# Patient Record
Sex: FEMALE | Race: WHITE | Employment: OTHER | URBAN - METROPOLITAN AREA
[De-identification: names, ages, dates, MRNs, and addresses within clinical notes are randomized per-mention and may not be internally consistent; named-entity substitution may affect disease eponyms.]

---

## 2017-01-01 ENCOUNTER — APPOINTMENT (OUTPATIENT)
Dept: GENERAL RADIOLOGY | Age: 82
End: 2017-01-01
Attending: EMERGENCY MEDICINE
Payer: MEDICARE

## 2017-01-01 ENCOUNTER — HOME CARE VISIT (OUTPATIENT)
Dept: SCHEDULING | Facility: HOME HEALTH | Age: 82
End: 2017-01-01

## 2017-01-01 ENCOUNTER — HOME CARE VISIT (OUTPATIENT)
Dept: HOME HEALTH SERVICES | Facility: HOME HEALTH | Age: 82
End: 2017-01-01

## 2017-01-01 ENCOUNTER — APPOINTMENT (OUTPATIENT)
Dept: CT IMAGING | Age: 82
End: 2017-01-01
Attending: EMERGENCY MEDICINE
Payer: MEDICARE

## 2017-01-01 ENCOUNTER — HOSPITAL ENCOUNTER (OUTPATIENT)
Age: 82
Setting detail: OBSERVATION
Discharge: HOME HEALTH | End: 2017-10-04
Attending: EMERGENCY MEDICINE | Admitting: FAMILY MEDICINE
Payer: MEDICARE

## 2017-01-01 ENCOUNTER — HOME HEALTH ADMISSION (OUTPATIENT)
Dept: HOME HEALTH SERVICES | Facility: HOME HEALTH | Age: 82
End: 2017-01-01

## 2017-01-01 ENCOUNTER — HOSPITAL ENCOUNTER (OUTPATIENT)
Age: 82
Setting detail: OBSERVATION
Discharge: HOME | End: 2017-01-22
Attending: EMERGENCY MEDICINE | Admitting: INTERNAL MEDICINE
Payer: MEDICARE

## 2017-01-01 ENCOUNTER — PATIENT OUTREACH (OUTPATIENT)
Dept: CASE MANAGEMENT | Age: 82
End: 2017-01-01

## 2017-01-01 ENCOUNTER — APPOINTMENT (OUTPATIENT)
Dept: MRI IMAGING | Age: 82
End: 2017-01-01
Attending: INTERNAL MEDICINE
Payer: MEDICARE

## 2017-01-01 ENCOUNTER — HOSPITAL ENCOUNTER (EMERGENCY)
Age: 82
Discharge: HOME | End: 2017-08-25
Attending: EMERGENCY MEDICINE
Payer: MEDICARE

## 2017-01-01 ENCOUNTER — HOSPITAL ENCOUNTER (EMERGENCY)
Age: 82
Discharge: HOME | End: 2017-07-21
Attending: EMERGENCY MEDICINE
Payer: MEDICARE

## 2017-01-01 VITALS
TEMPERATURE: 97.9 F | OXYGEN SATURATION: 93 % | BODY MASS INDEX: 25.52 KG/M2 | HEART RATE: 54 BPM | HEIGHT: 60 IN | DIASTOLIC BLOOD PRESSURE: 105 MMHG | RESPIRATION RATE: 18 BRPM | SYSTOLIC BLOOD PRESSURE: 231 MMHG | WEIGHT: 130 LBS

## 2017-01-01 VITALS
OXYGEN SATURATION: 94 % | TEMPERATURE: 98 F | DIASTOLIC BLOOD PRESSURE: 106 MMHG | WEIGHT: 146 LBS | RESPIRATION RATE: 18 BRPM | SYSTOLIC BLOOD PRESSURE: 227 MMHG | HEIGHT: 60 IN | HEART RATE: 60 BPM | BODY MASS INDEX: 28.66 KG/M2

## 2017-01-01 VITALS
OXYGEN SATURATION: 94 % | TEMPERATURE: 97.3 F | RESPIRATION RATE: 18 BRPM | BODY MASS INDEX: 28.33 KG/M2 | WEIGHT: 144.3 LBS | SYSTOLIC BLOOD PRESSURE: 144 MMHG | DIASTOLIC BLOOD PRESSURE: 75 MMHG | HEART RATE: 66 BPM | HEIGHT: 60 IN

## 2017-01-01 VITALS
SYSTOLIC BLOOD PRESSURE: 144 MMHG | HEIGHT: 60 IN | TEMPERATURE: 98 F | OXYGEN SATURATION: 92 % | DIASTOLIC BLOOD PRESSURE: 84 MMHG | WEIGHT: 123 LBS | BODY MASS INDEX: 24.15 KG/M2 | RESPIRATION RATE: 16 BRPM | HEART RATE: 65 BPM

## 2017-01-01 DIAGNOSIS — S00.03XA CONTUSION OF SCALP, INITIAL ENCOUNTER: Primary | ICD-10-CM
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
## 2017-01-01 DIAGNOSIS — I20.0 UNSTABLE ANGINA (HCC): Primary | ICD-10-CM

## 2017-01-01 DIAGNOSIS — I15.9 SECONDARY HYPERTENSION: Primary | ICD-10-CM

## 2017-01-01 DIAGNOSIS — R41.0 DELIRIUM: Primary | ICD-10-CM

## 2017-01-01 DIAGNOSIS — I10 HYPERTENSION, UNCONTROLLED: ICD-10-CM

## 2017-01-01 DIAGNOSIS — S20.222A CONTUSION OF LEFT BACK WALL OF THORAX, INITIAL ENCOUNTER: ICD-10-CM

## 2017-01-01 DIAGNOSIS — R42 VERTIGO: ICD-10-CM

## 2017-01-01 LAB
ALBUMIN SERPL BCP-MCNC: 3.4 G/DL (ref 3.2–4.6)
ALBUMIN SERPL BCP-MCNC: 3.7 G/DL (ref 3.2–4.6)
ALBUMIN SERPL-MCNC: 3.7 G/DL (ref 3.2–4.6)
ALBUMIN/GLOB SERPL: 0.9 {RATIO} (ref 1.2–3.5)
ALBUMIN/GLOB SERPL: 1 {RATIO} (ref 1.2–3.5)
ALBUMIN/GLOB SERPL: 1 {RATIO} (ref 1.2–3.5)
ALP SERPL-CCNC: 73 U/L (ref 50–136)
ALP SERPL-CCNC: 90 U/L (ref 50–136)
ALP SERPL-CCNC: 90 U/L (ref 50–136)
ALT SERPL-CCNC: 13 U/L (ref 12–65)
ALT SERPL-CCNC: 14 U/L (ref 12–65)
ALT SERPL-CCNC: 15 U/L (ref 12–65)
ANION GAP BLD CALC-SCNC: 11 MMOL/L (ref 7–16)
ANION GAP BLD CALC-SCNC: 7 MMOL/L (ref 7–16)
ANION GAP SERPL CALC-SCNC: 9 MMOL/L (ref 7–16)
AST SERPL W P-5'-P-CCNC: 36 U/L (ref 15–37)
AST SERPL W P-5'-P-CCNC: 38 U/L (ref 15–37)
AST SERPL-CCNC: 35 U/L (ref 15–37)
ATRIAL RATE: 66 BPM
ATRIAL RATE: 67 BPM
ATRIAL RATE: 68 BPM
ATRIAL RATE: 68 BPM
ATRIAL RATE: 71 BPM
ATRIAL RATE: 81 BPM
BACTERIA SPEC CULT: NORMAL
BACTERIA URNS QL MICRO: 0 /HPF
BACTERIA URNS QL MICRO: ABNORMAL /HPF
BASOPHILS # BLD AUTO: 0.1 K/UL (ref 0–0.2)
BASOPHILS # BLD: 0 K/UL (ref 0–0.2)
BASOPHILS # BLD: 1 % (ref 0–2)
BASOPHILS NFR BLD: 0 % (ref 0–2)
BILIRUB SERPL-MCNC: 0.3 MG/DL (ref 0.2–1.1)
BILIRUB SERPL-MCNC: 0.3 MG/DL (ref 0.2–1.1)
BILIRUB SERPL-MCNC: 0.4 MG/DL (ref 0.2–1.1)
BNP SERPL-MCNC: 159 PG/ML
BUN SERPL-MCNC: 17 MG/DL (ref 8–23)
BUN SERPL-MCNC: 18 MG/DL (ref 8–23)
BUN SERPL-MCNC: 21 MG/DL (ref 8–23)
CALCIUM SERPL-MCNC: 8.4 MG/DL (ref 8.3–10.4)
CALCIUM SERPL-MCNC: 9 MG/DL (ref 8.3–10.4)
CALCIUM SERPL-MCNC: 9.7 MG/DL (ref 8.3–10.4)
CALCULATED P AXIS, ECG09: 24 DEGREES
CALCULATED P AXIS, ECG09: 35 DEGREES
CALCULATED P AXIS, ECG09: 43 DEGREES
CALCULATED P AXIS, ECG09: 50 DEGREES
CALCULATED P AXIS, ECG09: 51 DEGREES
CALCULATED P AXIS, ECG09: 55 DEGREES
CALCULATED R AXIS, ECG10: -17 DEGREES
CALCULATED R AXIS, ECG10: -32 DEGREES
CALCULATED R AXIS, ECG10: -37 DEGREES
CALCULATED R AXIS, ECG10: -42 DEGREES
CALCULATED R AXIS, ECG10: -43 DEGREES
CALCULATED R AXIS, ECG10: -44 DEGREES
CALCULATED T AXIS, ECG11: -14 DEGREES
CALCULATED T AXIS, ECG11: 120 DEGREES
CALCULATED T AXIS, ECG11: 129 DEGREES
CALCULATED T AXIS, ECG11: 137 DEGREES
CALCULATED T AXIS, ECG11: 143 DEGREES
CALCULATED T AXIS, ECG11: 26 DEGREES
CASTS URNS QL MICRO: ABNORMAL /LPF
CASTS URNS QL MICRO: NORMAL /LPF
CHLORIDE SERPL-SCNC: 107 MMOL/L (ref 98–107)
CHLORIDE SERPL-SCNC: 109 MMOL/L (ref 98–107)
CHLORIDE SERPL-SCNC: 110 MMOL/L (ref 98–107)
CK SERPL-CCNC: 69 U/L (ref 21–215)
CO2 SERPL-SCNC: 23 MMOL/L (ref 21–32)
CO2 SERPL-SCNC: 23 MMOL/L (ref 21–32)
CO2 SERPL-SCNC: 27 MMOL/L (ref 21–32)
CREAT SERPL-MCNC: 0.86 MG/DL (ref 0.6–1)
CREAT SERPL-MCNC: 0.86 MG/DL (ref 0.6–1)
CREAT SERPL-MCNC: 0.91 MG/DL (ref 0.6–1)
CRYSTALS URNS QL MICRO: 0 /LPF
D DIMER PPP FEU-MCNC: 3.52 UG/ML(FEU)
DIAGNOSIS, 93000: NORMAL
DIASTOLIC BP, ECG02: NORMAL MMHG
DIFFERENTIAL METHOD BLD: ABNORMAL
EOSINOPHIL # BLD: 0 K/UL (ref 0–0.8)
EOSINOPHIL # BLD: 0.3 K/UL (ref 0–0.8)
EOSINOPHIL NFR BLD: 0 % (ref 0.5–7.8)
EOSINOPHIL NFR BLD: 4 % (ref 0.5–7.8)
EPI CELLS #/AREA URNS HPF: ABNORMAL /HPF
EPI CELLS #/AREA URNS HPF: NORMAL /HPF
ERYTHROCYTE [DISTWIDTH] IN BLOOD BY AUTOMATED COUNT: 15.2 % (ref 11.9–14.6)
ERYTHROCYTE [DISTWIDTH] IN BLOOD BY AUTOMATED COUNT: 16.4 % (ref 11.9–14.6)
ERYTHROCYTE [DISTWIDTH] IN BLOOD BY AUTOMATED COUNT: 17.2 % (ref 11.9–14.6)
EST. AVERAGE GLUCOSE BLD GHB EST-MCNC: 126 MG/DL
GLOBULIN SER CALC-MCNC: 3.7 G/DL (ref 2.3–3.5)
GLOBULIN SER CALC-MCNC: 3.8 G/DL (ref 2.3–3.5)
GLOBULIN SER CALC-MCNC: 3.9 G/DL (ref 2.3–3.5)
GLUCOSE BLD STRIP.AUTO-MCNC: 105 MG/DL (ref 65–100)
GLUCOSE BLD STRIP.AUTO-MCNC: 107 MG/DL (ref 65–100)
GLUCOSE BLD STRIP.AUTO-MCNC: 107 MG/DL (ref 65–100)
GLUCOSE BLD STRIP.AUTO-MCNC: 118 MG/DL (ref 65–100)
GLUCOSE BLD STRIP.AUTO-MCNC: 131 MG/DL (ref 65–100)
GLUCOSE BLD STRIP.AUTO-MCNC: 91 MG/DL (ref 65–100)
GLUCOSE BLD STRIP.AUTO-MCNC: 98 MG/DL (ref 65–100)
GLUCOSE SERPL-MCNC: 111 MG/DL (ref 65–100)
GLUCOSE SERPL-MCNC: 112 MG/DL (ref 65–100)
GLUCOSE SERPL-MCNC: 118 MG/DL (ref 65–100)
HBA1C MFR BLD: 6 % (ref 4.8–6)
HCT VFR BLD AUTO: 32.9 % (ref 35.8–46.3)
HCT VFR BLD AUTO: 34.8 % (ref 35.8–46.3)
HCT VFR BLD AUTO: 35.2 % (ref 35.8–46.3)
HGB BLD-MCNC: 10.7 G/DL (ref 11.7–15.4)
HGB BLD-MCNC: 11 G/DL (ref 11.7–15.4)
HGB BLD-MCNC: 11.7 G/DL (ref 11.7–15.4)
IMM GRANULOCYTES # BLD: 0 K/UL (ref 0–0.5)
IMM GRANULOCYTES # BLD: 0 K/UL (ref 0–0.5)
IMM GRANULOCYTES NFR BLD AUTO: 0 % (ref 0–5)
IMM GRANULOCYTES NFR BLD: 0.2 % (ref 0–5)
LYMPHOCYTES # BLD AUTO: 30 % (ref 13–44)
LYMPHOCYTES # BLD: 1 K/UL (ref 0.5–4.6)
LYMPHOCYTES # BLD: 1.1 K/UL (ref 0.5–4.6)
LYMPHOCYTES # BLD: 2.1 K/UL (ref 0.5–4.6)
LYMPHOCYTES NFR BLD MANUAL: 8 % (ref 16–44)
LYMPHOCYTES NFR BLD: 11 % (ref 13–44)
MCH RBC QN AUTO: 26 PG (ref 26.1–32.9)
MCH RBC QN AUTO: 27 PG (ref 26.1–32.9)
MCH RBC QN AUTO: 27 PG (ref 26.1–32.9)
MCHC RBC AUTO-ENTMCNC: 31.6 G/DL (ref 31.4–35)
MCHC RBC AUTO-ENTMCNC: 32.5 G/DL (ref 31.4–35)
MCHC RBC AUTO-ENTMCNC: 33.2 G/DL (ref 31.4–35)
MCV RBC AUTO: 80 FL (ref 79.6–97.8)
MCV RBC AUTO: 81.3 FL (ref 79.6–97.8)
MCV RBC AUTO: 85.3 FL (ref 79.6–97.8)
MONOCYTES # BLD: 0.5 K/UL (ref 0.1–1.3)
MONOCYTES # BLD: 0.7 K/UL (ref 0.1–1.3)
MONOCYTES # BLD: 0.8 K/UL (ref 0.1–1.3)
MONOCYTES NFR BLD AUTO: 11 % (ref 4–12)
MONOCYTES NFR BLD MANUAL: 6 % (ref 3–9)
MONOCYTES NFR BLD: 5 % (ref 4–12)
MUCOUS THREADS URNS QL MICRO: ABNORMAL /LPF
NEUTS BAND NFR BLD MANUAL: 3 % (ref 0–10)
NEUTS SEG # BLD: 10.3 K/UL (ref 1.7–8.2)
NEUTS SEG # BLD: 3.6 K/UL (ref 1.7–8.2)
NEUTS SEG # BLD: 8.6 K/UL (ref 1.7–8.2)
NEUTS SEG NFR BLD AUTO: 54 % (ref 43–78)
NEUTS SEG NFR BLD MANUAL: 83 % (ref 47–75)
NEUTS SEG NFR BLD: 84 % (ref 43–78)
OTHER OBSERVATIONS,UCOM: ABNORMAL
P-R INTERVAL, ECG05: 194 MS
P-R INTERVAL, ECG05: 206 MS
P-R INTERVAL, ECG05: 216 MS
P-R INTERVAL, ECG05: 220 MS
P-R INTERVAL, ECG05: 230 MS
P-R INTERVAL, ECG05: 232 MS
PLATELET # BLD AUTO: 217 K/UL (ref 150–450)
PLATELET # BLD AUTO: 219 K/UL (ref 150–450)
PLATELET # BLD AUTO: 252 K/UL (ref 150–450)
PLATELET COMMENTS,PCOM: ADEQUATE
PLATELET COMMENTS,PCOM: ADEQUATE
PMV BLD AUTO: 10.6 FL (ref 10.8–14.1)
PMV BLD AUTO: 10.6 FL (ref 10.8–14.1)
PMV BLD AUTO: 10.9 FL (ref 10.8–14.1)
POTASSIUM SERPL-SCNC: 3.5 MMOL/L (ref 3.5–5.1)
POTASSIUM SERPL-SCNC: 4.3 MMOL/L (ref 3.5–5.1)
POTASSIUM SERPL-SCNC: 4.3 MMOL/L (ref 3.5–5.1)
PROT SERPL-MCNC: 7.3 G/DL (ref 6.3–8.2)
PROT SERPL-MCNC: 7.4 G/DL (ref 6.3–8.2)
PROT SERPL-MCNC: 7.5 G/DL (ref 6.3–8.2)
Q-T INTERVAL, ECG07: 370 MS
Q-T INTERVAL, ECG07: 374 MS
Q-T INTERVAL, ECG07: 382 MS
Q-T INTERVAL, ECG07: 432 MS
Q-T INTERVAL, ECG07: 466 MS
Q-T INTERVAL, ECG07: 472 MS
QRS DURATION, ECG06: 82 MS
QRS DURATION, ECG06: 86 MS
QRS DURATION, ECG06: 88 MS
QRS DURATION, ECG06: 90 MS
QTC CALCULATION (BEZET), ECG08: 406 MS
QTC CALCULATION (BEZET), ECG08: 406 MS
QTC CALCULATION (BEZET), ECG08: 429 MS
QTC CALCULATION (BEZET), ECG08: 456 MS
QTC CALCULATION (BEZET), ECG08: 488 MS
QTC CALCULATION (BEZET), ECG08: 501 MS
RBC # BLD AUTO: 4.08 M/UL (ref 4.05–5.25)
RBC # BLD AUTO: 4.11 M/UL (ref 4.05–5.25)
RBC # BLD AUTO: 4.33 M/UL (ref 4.05–5.25)
RBC #/AREA URNS HPF: 0 /HPF
RBC #/AREA URNS HPF: ABNORMAL /HPF
RBC MORPH BLD: ABNORMAL
RBC MORPH BLD: ABNORMAL
SERVICE CMNT-IMP: NORMAL
SODIUM SERPL-SCNC: 139 MMOL/L (ref 136–145)
SODIUM SERPL-SCNC: 143 MMOL/L (ref 136–145)
SODIUM SERPL-SCNC: 144 MMOL/L (ref 136–145)
SYSTOLIC BP, ECG01: NORMAL MMHG
TROPONIN I BLD-MCNC: 0.29 NG/ML (ref 0–0.08)
TROPONIN I SERPL-MCNC: 0.04 NG/ML (ref 0.02–0.05)
TROPONIN I SERPL-MCNC: 0.93 NG/ML (ref 0.02–0.05)
TROPONIN I SERPL-MCNC: <0.02 NG/ML (ref 0.02–0.05)
TROPONIN I SERPL-MCNC: <0.02 NG/ML (ref 0.02–0.05)
VENTRICULAR RATE, ECG03: 66 BPM
VENTRICULAR RATE, ECG03: 67 BPM
VENTRICULAR RATE, ECG03: 68 BPM
VENTRICULAR RATE, ECG03: 68 BPM
VENTRICULAR RATE, ECG03: 71 BPM
VENTRICULAR RATE, ECG03: 81 BPM
WBC # BLD AUTO: 10.3 K/UL (ref 4.3–11.1)
WBC # BLD AUTO: 12 K/UL (ref 4.3–11.1)
WBC # BLD AUTO: 6.9 K/UL (ref 4.3–11.1)
WBC MORPH BLD: ABNORMAL
WBC URNS QL MICRO: ABNORMAL /HPF
WBC URNS QL MICRO: NORMAL /HPF

## 2017-01-01 PROCEDURE — 82962 GLUCOSE BLOOD TEST: CPT

## 2017-01-01 PROCEDURE — 82550 ASSAY OF CK (CPK): CPT | Performed by: EMERGENCY MEDICINE

## 2017-01-01 PROCEDURE — 85025 COMPLETE CBC W/AUTO DIFF WBC: CPT | Performed by: EMERGENCY MEDICINE

## 2017-01-01 PROCEDURE — 99284 EMERGENCY DEPT VISIT MOD MDM: CPT | Performed by: EMERGENCY MEDICINE

## 2017-01-01 PROCEDURE — 96372 THER/PROPH/DIAG INJ SC/IM: CPT

## 2017-01-01 PROCEDURE — 96375 TX/PRO/DX INJ NEW DRUG ADDON: CPT

## 2017-01-01 PROCEDURE — 93005 ELECTROCARDIOGRAM TRACING: CPT | Performed by: EMERGENCY MEDICINE

## 2017-01-01 PROCEDURE — 80053 COMPREHEN METABOLIC PANEL: CPT | Performed by: EMERGENCY MEDICINE

## 2017-01-01 PROCEDURE — 97161 PT EVAL LOW COMPLEX 20 MIN: CPT

## 2017-01-01 PROCEDURE — 74011250636 HC RX REV CODE- 250/636: Performed by: EMERGENCY MEDICINE

## 2017-01-01 PROCEDURE — 71260 CT THORAX DX C+: CPT

## 2017-01-01 PROCEDURE — 72100 X-RAY EXAM L-S SPINE 2/3 VWS: CPT

## 2017-01-01 PROCEDURE — 87086 URINE CULTURE/COLONY COUNT: CPT | Performed by: EMERGENCY MEDICINE

## 2017-01-01 PROCEDURE — 83880 ASSAY OF NATRIURETIC PEPTIDE: CPT | Performed by: EMERGENCY MEDICINE

## 2017-01-01 PROCEDURE — 81003 URINALYSIS AUTO W/O SCOPE: CPT | Performed by: EMERGENCY MEDICINE

## 2017-01-01 PROCEDURE — 96375 TX/PRO/DX INJ NEW DRUG ADDON: CPT | Performed by: EMERGENCY MEDICINE

## 2017-01-01 PROCEDURE — 96374 THER/PROPH/DIAG INJ IV PUSH: CPT | Performed by: EMERGENCY MEDICINE

## 2017-01-01 PROCEDURE — 83036 HEMOGLOBIN GLYCOSYLATED A1C: CPT | Performed by: FAMILY MEDICINE

## 2017-01-01 PROCEDURE — 99285 EMERGENCY DEPT VISIT HI MDM: CPT | Performed by: EMERGENCY MEDICINE

## 2017-01-01 PROCEDURE — 84484 ASSAY OF TROPONIN QUANT: CPT | Performed by: EMERGENCY MEDICINE

## 2017-01-01 PROCEDURE — G8979 MOBILITY GOAL STATUS: HCPCS

## 2017-01-01 PROCEDURE — 99218 HC RM OBSERVATION: CPT

## 2017-01-01 PROCEDURE — 81015 MICROSCOPIC EXAM OF URINE: CPT | Performed by: EMERGENCY MEDICINE

## 2017-01-01 PROCEDURE — G8978 MOBILITY CURRENT STATUS: HCPCS

## 2017-01-01 PROCEDURE — 70450 CT HEAD/BRAIN W/O DYE: CPT

## 2017-01-01 PROCEDURE — 96361 HYDRATE IV INFUSION ADD-ON: CPT | Performed by: EMERGENCY MEDICINE

## 2017-01-01 PROCEDURE — 74011250637 HC RX REV CODE- 250/637: Performed by: INTERNAL MEDICINE

## 2017-01-01 PROCEDURE — 74011250637 HC RX REV CODE- 250/637: Performed by: FAMILY MEDICINE

## 2017-01-01 PROCEDURE — G8980 MOBILITY D/C STATUS: HCPCS

## 2017-01-01 PROCEDURE — 74011250637 HC RX REV CODE- 250/637: Performed by: EMERGENCY MEDICINE

## 2017-01-01 PROCEDURE — 85379 FIBRIN DEGRADATION QUANT: CPT | Performed by: EMERGENCY MEDICINE

## 2017-01-01 PROCEDURE — 71010 XR CHEST PORT: CPT

## 2017-01-01 PROCEDURE — 96365 THER/PROPH/DIAG IV INF INIT: CPT | Performed by: EMERGENCY MEDICINE

## 2017-01-01 PROCEDURE — 74011000258 HC RX REV CODE- 258: Performed by: EMERGENCY MEDICINE

## 2017-01-01 PROCEDURE — 93005 ELECTROCARDIOGRAM TRACING: CPT | Performed by: INTERNAL MEDICINE

## 2017-01-01 PROCEDURE — G8989 SELF CARE D/C STATUS: HCPCS

## 2017-01-01 PROCEDURE — 97165 OT EVAL LOW COMPLEX 30 MIN: CPT

## 2017-01-01 PROCEDURE — 90471 IMMUNIZATION ADMIN: CPT

## 2017-01-01 PROCEDURE — 74011250636 HC RX REV CODE- 250/636: Performed by: FAMILY MEDICINE

## 2017-01-01 PROCEDURE — 71020 XR CHEST PA LAT: CPT

## 2017-01-01 PROCEDURE — 96366 THER/PROPH/DIAG IV INF ADDON: CPT

## 2017-01-01 PROCEDURE — 70551 MRI BRAIN STEM W/O DYE: CPT

## 2017-01-01 PROCEDURE — G8988 SELF CARE GOAL STATUS: HCPCS

## 2017-01-01 PROCEDURE — 90686 IIV4 VACC NO PRSV 0.5 ML IM: CPT | Performed by: FAMILY MEDICINE

## 2017-01-01 PROCEDURE — G8987 SELF CARE CURRENT STATUS: HCPCS

## 2017-01-01 PROCEDURE — 74011636320 HC RX REV CODE- 636/320: Performed by: EMERGENCY MEDICINE

## 2017-01-01 RX ORDER — NITROGLYCERIN 0.4 MG/1
0.4 TABLET SUBLINGUAL
Status: DISCONTINUED | OUTPATIENT
Start: 2017-01-01 | End: 2017-01-01 | Stop reason: HOSPADM

## 2017-01-01 RX ORDER — ONDANSETRON 2 MG/ML
4 INJECTION INTRAMUSCULAR; INTRAVENOUS
Status: DISCONTINUED | OUTPATIENT
Start: 2017-01-01 | End: 2017-01-01 | Stop reason: HOSPADM

## 2017-01-01 RX ORDER — METOCLOPRAMIDE HYDROCHLORIDE 5 MG/ML
10 INJECTION INTRAMUSCULAR; INTRAVENOUS
Status: COMPLETED | OUTPATIENT
Start: 2017-01-01 | End: 2017-01-01

## 2017-01-01 RX ORDER — PANTOPRAZOLE SODIUM 40 MG/1
40 TABLET, DELAYED RELEASE ORAL
Status: DISCONTINUED | OUTPATIENT
Start: 2017-01-01 | End: 2017-01-01 | Stop reason: HOSPADM

## 2017-01-01 RX ORDER — ALLOPURINOL 100 MG/1
100 TABLET ORAL DAILY
Status: DISCONTINUED | OUTPATIENT
Start: 2017-01-01 | End: 2017-01-01 | Stop reason: HOSPADM

## 2017-01-01 RX ORDER — HALOPERIDOL 5 MG/ML
2 INJECTION INTRAMUSCULAR
Status: DISCONTINUED | OUTPATIENT
Start: 2017-01-01 | End: 2017-01-01 | Stop reason: HOSPADM

## 2017-01-01 RX ORDER — FENTANYL CITRATE 50 UG/ML
50 INJECTION, SOLUTION INTRAMUSCULAR; INTRAVENOUS
Status: COMPLETED | OUTPATIENT
Start: 2017-01-01 | End: 2017-01-01

## 2017-01-01 RX ORDER — HEPARIN SODIUM 5000 [USP'U]/ML
5000 INJECTION, SOLUTION INTRAVENOUS; SUBCUTANEOUS EVERY 8 HOURS
Status: DISCONTINUED | OUTPATIENT
Start: 2017-01-01 | End: 2017-01-01 | Stop reason: HOSPADM

## 2017-01-01 RX ORDER — ACETAMINOPHEN 325 MG/1
650 TABLET ORAL
Status: DISCONTINUED | OUTPATIENT
Start: 2017-01-01 | End: 2017-01-01 | Stop reason: HOSPADM

## 2017-01-01 RX ORDER — DEXTROSE 50 % IN WATER (D50W) INTRAVENOUS SYRINGE
25-50 AS NEEDED
Status: DISCONTINUED | OUTPATIENT
Start: 2017-01-01 | End: 2017-01-01 | Stop reason: HOSPADM

## 2017-01-01 RX ORDER — SODIUM CHLORIDE 0.9 % (FLUSH) 0.9 %
5-10 SYRINGE (ML) INJECTION EVERY 8 HOURS
Status: DISCONTINUED | OUTPATIENT
Start: 2017-01-01 | End: 2017-01-01 | Stop reason: HOSPADM

## 2017-01-01 RX ORDER — ASPIRIN 81 MG/1
81 TABLET ORAL DAILY
Status: DISCONTINUED | OUTPATIENT
Start: 2017-01-01 | End: 2017-01-01 | Stop reason: HOSPADM

## 2017-01-01 RX ORDER — LOSARTAN POTASSIUM 50 MG/1
100 TABLET ORAL DAILY
Status: DISCONTINUED | OUTPATIENT
Start: 2017-01-01 | End: 2017-01-01 | Stop reason: HOSPADM

## 2017-01-01 RX ORDER — MECLIZINE HYDROCHLORIDE 25 MG/1
25 TABLET ORAL
Status: DISCONTINUED | OUTPATIENT
Start: 2017-01-01 | End: 2017-01-01 | Stop reason: HOSPADM

## 2017-01-01 RX ORDER — HEPARIN SODIUM 5000 [USP'U]/ML
4000 INJECTION, SOLUTION INTRAVENOUS; SUBCUTANEOUS
Status: COMPLETED | OUTPATIENT
Start: 2017-01-01 | End: 2017-01-01

## 2017-01-01 RX ORDER — LANOLIN ALCOHOL/MO/W.PET/CERES
325 CREAM (GRAM) TOPICAL
Status: DISCONTINUED | OUTPATIENT
Start: 2017-01-01 | End: 2017-01-01 | Stop reason: HOSPADM

## 2017-01-01 RX ORDER — ACETAMINOPHEN 500 MG
1000 TABLET ORAL
Status: COMPLETED | OUTPATIENT
Start: 2017-01-01 | End: 2017-01-01

## 2017-01-01 RX ORDER — SERTRALINE HYDROCHLORIDE 100 MG/1
100 TABLET, FILM COATED ORAL DAILY
Status: DISCONTINUED | OUTPATIENT
Start: 2017-01-01 | End: 2017-01-01 | Stop reason: HOSPADM

## 2017-01-01 RX ORDER — HYDRALAZINE HYDROCHLORIDE 20 MG/ML
10 INJECTION INTRAMUSCULAR; INTRAVENOUS
Status: COMPLETED | OUTPATIENT
Start: 2017-01-01 | End: 2017-01-01

## 2017-01-01 RX ORDER — HALOPERIDOL 2 MG/1
2 TABLET ORAL
Status: DISCONTINUED | OUTPATIENT
Start: 2017-01-01 | End: 2017-01-01

## 2017-01-01 RX ORDER — CLOPIDOGREL BISULFATE 75 MG/1
75 TABLET ORAL DAILY
Status: DISCONTINUED | OUTPATIENT
Start: 2017-01-01 | End: 2017-01-01 | Stop reason: HOSPADM

## 2017-01-01 RX ORDER — HYDRALAZINE HYDROCHLORIDE 20 MG/ML
10 INJECTION INTRAMUSCULAR; INTRAVENOUS
Status: DISCONTINUED | OUTPATIENT
Start: 2017-01-01 | End: 2017-01-01

## 2017-01-01 RX ORDER — MECLIZINE HYDROCHLORIDE 25 MG/1
25 TABLET ORAL
Status: COMPLETED | OUTPATIENT
Start: 2017-01-01 | End: 2017-01-01

## 2017-01-01 RX ORDER — LORAZEPAM 2 MG/ML
2 INJECTION INTRAMUSCULAR
Status: DISCONTINUED | OUTPATIENT
Start: 2017-01-01 | End: 2017-01-01 | Stop reason: HOSPADM

## 2017-01-01 RX ORDER — CLONIDINE HYDROCHLORIDE 0.1 MG/1
0.2 TABLET ORAL
Status: COMPLETED | OUTPATIENT
Start: 2017-01-01 | End: 2017-01-01

## 2017-01-01 RX ORDER — LANOLIN ALCOHOL/MO/W.PET/CERES
1000 CREAM (GRAM) TOPICAL DAILY
Status: DISCONTINUED | OUTPATIENT
Start: 2017-01-01 | End: 2017-01-01 | Stop reason: HOSPADM

## 2017-01-01 RX ORDER — TRIAMTERENE/HYDROCHLOROTHIAZID 37.5-25 MG
1 TABLET ORAL DAILY
Status: DISCONTINUED | OUTPATIENT
Start: 2017-01-01 | End: 2017-01-01 | Stop reason: HOSPADM

## 2017-01-01 RX ORDER — HYDRALAZINE HYDROCHLORIDE 20 MG/ML
20 INJECTION INTRAMUSCULAR; INTRAVENOUS
Status: DISCONTINUED | OUTPATIENT
Start: 2017-01-01 | End: 2017-01-01 | Stop reason: HOSPADM

## 2017-01-01 RX ORDER — SODIUM CHLORIDE 0.9 % (FLUSH) 0.9 %
5-10 SYRINGE (ML) INJECTION AS NEEDED
Status: DISCONTINUED | OUTPATIENT
Start: 2017-01-01 | End: 2017-01-01 | Stop reason: HOSPADM

## 2017-01-01 RX ORDER — SODIUM CHLORIDE 0.9 % (FLUSH) 0.9 %
10 SYRINGE (ML) INJECTION
Status: COMPLETED | OUTPATIENT
Start: 2017-01-01 | End: 2017-01-01

## 2017-01-01 RX ORDER — ONDANSETRON 2 MG/ML
4 INJECTION INTRAMUSCULAR; INTRAVENOUS
Status: COMPLETED | OUTPATIENT
Start: 2017-01-01 | End: 2017-01-01

## 2017-01-01 RX ORDER — INSULIN LISPRO 100 [IU]/ML
INJECTION, SOLUTION INTRAVENOUS; SUBCUTANEOUS
Status: DISCONTINUED | OUTPATIENT
Start: 2017-01-01 | End: 2017-01-01 | Stop reason: HOSPADM

## 2017-01-01 RX ORDER — HYDRALAZINE HYDROCHLORIDE 20 MG/ML
10 INJECTION INTRAMUSCULAR; INTRAVENOUS
Status: DISCONTINUED | OUTPATIENT
Start: 2017-01-01 | End: 2017-01-01 | Stop reason: SDUPTHER

## 2017-01-01 RX ORDER — HALOPERIDOL 5 MG/ML
2 INJECTION INTRAMUSCULAR
Status: DISCONTINUED | OUTPATIENT
Start: 2017-01-01 | End: 2017-01-01

## 2017-01-01 RX ORDER — POTASSIUM CHLORIDE 20 MEQ/1
20 TABLET, EXTENDED RELEASE ORAL 2 TIMES DAILY
Status: DISCONTINUED | OUTPATIENT
Start: 2017-01-01 | End: 2017-01-01 | Stop reason: HOSPADM

## 2017-01-01 RX ORDER — HYDRALAZINE HYDROCHLORIDE 20 MG/ML
20 INJECTION INTRAMUSCULAR; INTRAVENOUS
Status: COMPLETED | OUTPATIENT
Start: 2017-01-01 | End: 2017-01-01

## 2017-01-01 RX ORDER — PROMETHAZINE HYDROCHLORIDE 25 MG/1
25 TABLET ORAL
Status: DISCONTINUED | OUTPATIENT
Start: 2017-01-01 | End: 2017-01-01 | Stop reason: HOSPADM

## 2017-01-01 RX ORDER — ASCORBIC ACID 500 MG
500 TABLET ORAL 2 TIMES DAILY
Status: DISCONTINUED | OUTPATIENT
Start: 2017-01-01 | End: 2017-01-01

## 2017-01-01 RX ORDER — SODIUM CHLORIDE 9 MG/ML
500 INJECTION, SOLUTION INTRAVENOUS ONCE
Status: COMPLETED | OUTPATIENT
Start: 2017-01-01 | End: 2017-01-01

## 2017-01-01 RX ORDER — DEXTROSE 40 %
15 GEL (GRAM) ORAL AS NEEDED
Status: DISCONTINUED | OUTPATIENT
Start: 2017-01-01 | End: 2017-01-01 | Stop reason: HOSPADM

## 2017-01-01 RX ORDER — HALOPERIDOL 5 MG/ML
1 INJECTION INTRAMUSCULAR
Status: DISCONTINUED | OUTPATIENT
Start: 2017-01-01 | End: 2017-01-01

## 2017-01-01 RX ORDER — FOLIC ACID 1 MG/1
1 TABLET ORAL DAILY
Status: DISCONTINUED | OUTPATIENT
Start: 2017-01-01 | End: 2017-01-01 | Stop reason: HOSPADM

## 2017-01-01 RX ORDER — HEPARIN SODIUM 5000 [USP'U]/100ML
12-25 INJECTION, SOLUTION INTRAVENOUS
Status: DISCONTINUED | OUTPATIENT
Start: 2017-01-01 | End: 2017-01-01

## 2017-01-01 RX ORDER — GUAIFENESIN 100 MG/5ML
324 LIQUID (ML) ORAL
Status: COMPLETED | OUTPATIENT
Start: 2017-01-01 | End: 2017-01-01

## 2017-01-01 RX ORDER — FLUTICASONE PROPIONATE AND SALMETEROL 250; 50 UG/1; UG/1
1 POWDER RESPIRATORY (INHALATION)
Status: DISCONTINUED | OUTPATIENT
Start: 2017-01-01 | End: 2017-01-01 | Stop reason: HOSPADM

## 2017-01-01 RX ORDER — METOPROLOL TARTRATE 25 MG/1
25 TABLET, FILM COATED ORAL 2 TIMES DAILY
Status: DISCONTINUED | OUTPATIENT
Start: 2017-01-01 | End: 2017-01-01 | Stop reason: HOSPADM

## 2017-01-01 RX ORDER — LEVOTHYROXINE SODIUM 112 UG/1
112 TABLET ORAL DAILY
Status: DISCONTINUED | OUTPATIENT
Start: 2017-01-01 | End: 2017-01-01 | Stop reason: HOSPADM

## 2017-01-01 RX ORDER — PRAVASTATIN SODIUM 20 MG/1
40 TABLET ORAL
Status: DISCONTINUED | OUTPATIENT
Start: 2017-01-01 | End: 2017-01-01 | Stop reason: HOSPADM

## 2017-01-01 RX ADMIN — METOCLOPRAMIDE 10 MG: 5 INJECTION, SOLUTION INTRAMUSCULAR; INTRAVENOUS at 20:15

## 2017-01-01 RX ADMIN — PANTOPRAZOLE SODIUM 40 MG: 40 TABLET, DELAYED RELEASE ORAL at 05:41

## 2017-01-01 RX ADMIN — CLOPIDOGREL BISULFATE 75 MG: 75 TABLET ORAL at 11:51

## 2017-01-01 RX ADMIN — ALLOPURINOL 100 MG: 100 TABLET ORAL at 11:51

## 2017-01-01 RX ADMIN — POTASSIUM CHLORIDE 20 MEQ: 20 TABLET, EXTENDED RELEASE ORAL at 11:51

## 2017-01-01 RX ADMIN — ASPIRIN 81 MG: 81 TABLET, COATED ORAL at 08:24

## 2017-01-01 RX ADMIN — TRIAMTERENE AND HYDROCHLOROTHIAZIDE 1 TABLET: 37.5; 25 TABLET ORAL at 08:23

## 2017-01-01 RX ADMIN — FLUTICASONE PROPIONATE AND SALMETEROL 1 PUFF: 250; 50 POWDER RESPIRATORY (INHALATION) at 08:22

## 2017-01-01 RX ADMIN — POTASSIUM CHLORIDE 20 MEQ: 20 TABLET, EXTENDED RELEASE ORAL at 08:23

## 2017-01-01 RX ADMIN — ALLOPURINOL 100 MG: 100 TABLET ORAL at 08:24

## 2017-01-01 RX ADMIN — FENTANYL CITRATE 50 MCG: 50 INJECTION, SOLUTION INTRAMUSCULAR; INTRAVENOUS at 16:33

## 2017-01-01 RX ADMIN — FOLIC ACID 1 MG: 1 TABLET ORAL at 11:51

## 2017-01-01 RX ADMIN — HEPARIN SODIUM 5000 UNITS: 5000 INJECTION, SOLUTION INTRAVENOUS; SUBCUTANEOUS at 05:30

## 2017-01-01 RX ADMIN — SODIUM CHLORIDE 1000 ML: 900 INJECTION, SOLUTION INTRAVENOUS at 22:19

## 2017-01-01 RX ADMIN — ASPIRIN 81 MG: 81 TABLET, COATED ORAL at 11:51

## 2017-01-01 RX ADMIN — MECLIZINE HYDROCHLORIDE 25 MG: 25 TABLET ORAL at 16:33

## 2017-01-01 RX ADMIN — POTASSIUM CHLORIDE 20 MEQ: 20 TABLET, EXTENDED RELEASE ORAL at 18:12

## 2017-01-01 RX ADMIN — NITROGLYCERIN 0.5 INCH: 20 OINTMENT TOPICAL at 18:44

## 2017-01-01 RX ADMIN — IOPAMIDOL 100 ML: 755 INJECTION, SOLUTION INTRAVENOUS at 20:19

## 2017-01-01 RX ADMIN — SERTRALINE HYDROCHLORIDE 100 MG: 100 TABLET ORAL at 08:23

## 2017-01-01 RX ADMIN — Medication 10 ML: at 20:20

## 2017-01-01 RX ADMIN — ACETAMINOPHEN 650 MG: 325 TABLET, FILM COATED ORAL at 01:31

## 2017-01-01 RX ADMIN — ONDANSETRON 4 MG: 2 INJECTION INTRAMUSCULAR; INTRAVENOUS at 22:40

## 2017-01-01 RX ADMIN — SODIUM CHLORIDE 100 ML: 900 INJECTION, SOLUTION INTRAVENOUS at 20:19

## 2017-01-01 RX ADMIN — TRIAMTERENE AND HYDROCHLOROTHIAZIDE 1 TABLET: 37.5; 25 TABLET ORAL at 11:55

## 2017-01-01 RX ADMIN — METOPROLOL TARTRATE 25 MG: 25 TABLET ORAL at 12:19

## 2017-01-01 RX ADMIN — ASPIRIN 81 MG 324 MG: 81 TABLET ORAL at 18:44

## 2017-01-01 RX ADMIN — HEPARIN SODIUM AND DEXTROSE 12 UNITS/KG/HR: 5000; 5 INJECTION INTRAVENOUS at 19:08

## 2017-01-01 RX ADMIN — Medication 10 ML: at 04:53

## 2017-01-01 RX ADMIN — CYANOCOBALAMIN TAB 1000 MCG 1000 MCG: 1000 TAB at 08:24

## 2017-01-01 RX ADMIN — Medication 10 ML: at 05:30

## 2017-01-01 RX ADMIN — HEPARIN SODIUM 5000 UNITS: 5000 INJECTION, SOLUTION INTRAVENOUS; SUBCUTANEOUS at 21:19

## 2017-01-01 RX ADMIN — ACETAMINOPHEN 1000 MG: 500 TABLET ORAL at 20:14

## 2017-01-01 RX ADMIN — HYDRALAZINE HYDROCHLORIDE 10 MG: 20 INJECTION INTRAMUSCULAR; INTRAVENOUS at 22:45

## 2017-01-01 RX ADMIN — FOLIC ACID 1 MG: 1 TABLET ORAL at 08:24

## 2017-01-01 RX ADMIN — SODIUM CHLORIDE 500 ML: 900 INJECTION, SOLUTION INTRAVENOUS at 20:14

## 2017-01-01 RX ADMIN — CYANOCOBALAMIN TAB 1000 MCG 1000 MCG: 1000 TAB at 11:53

## 2017-01-01 RX ADMIN — INFLUENZA VIRUS VACCINE 0.5 ML: 15; 15; 15; 15 SUSPENSION INTRAMUSCULAR at 10:54

## 2017-01-01 RX ADMIN — Medication 5 ML: at 13:16

## 2017-01-01 RX ADMIN — LOSARTAN POTASSIUM 100 MG: 50 TABLET ORAL at 12:18

## 2017-01-01 RX ADMIN — Medication 5 ML: at 21:19

## 2017-01-01 RX ADMIN — FERROUS SULFATE TAB 325 MG (65 MG ELEMENTAL FE) 325 MG: 325 (65 FE) TAB at 05:41

## 2017-01-01 RX ADMIN — HYDRALAZINE HYDROCHLORIDE 20 MG: 20 INJECTION INTRAMUSCULAR; INTRAVENOUS at 18:17

## 2017-01-01 RX ADMIN — Medication 5 ML: at 22:02

## 2017-01-01 RX ADMIN — PRAVASTATIN SODIUM 40 MG: 20 TABLET ORAL at 21:19

## 2017-01-01 RX ADMIN — HEPARIN SODIUM 5000 UNITS: 5000 INJECTION, SOLUTION INTRAVENOUS; SUBCUTANEOUS at 05:41

## 2017-01-01 RX ADMIN — SERTRALINE HYDROCHLORIDE 100 MG: 100 TABLET ORAL at 11:51

## 2017-01-01 RX ADMIN — HALOPERIDOL LACTATE 1 MG: 5 INJECTION, SOLUTION INTRAMUSCULAR at 02:51

## 2017-01-01 RX ADMIN — ONDANSETRON 4 MG: 2 INJECTION INTRAMUSCULAR; INTRAVENOUS at 18:17

## 2017-01-01 RX ADMIN — Medication 5 ML: at 05:41

## 2017-01-01 RX ADMIN — LEVOTHYROXINE SODIUM 112 MCG: 112 TABLET ORAL at 11:51

## 2017-01-01 RX ADMIN — HEPARIN SODIUM 4000 UNITS: 5000 INJECTION, SOLUTION INTRAVENOUS; SUBCUTANEOUS at 19:08

## 2017-01-01 RX ADMIN — OXYCODONE HYDROCHLORIDE AND ACETAMINOPHEN 500 MG: 500 TABLET ORAL at 11:52

## 2017-01-01 RX ADMIN — HEPARIN SODIUM 5000 UNITS: 5000 INJECTION, SOLUTION INTRAVENOUS; SUBCUTANEOUS at 18:12

## 2017-01-01 RX ADMIN — CLONIDINE HYDROCHLORIDE 0.2 MG: 0.1 TABLET ORAL at 22:35

## 2017-01-01 RX ADMIN — ACETAMINOPHEN 650 MG: 325 TABLET, FILM COATED ORAL at 11:43

## 2017-01-01 RX ADMIN — LEVOTHYROXINE SODIUM 112 MCG: 112 TABLET ORAL at 08:23

## 2017-01-21 NOTE — ED PROVIDER NOTES
HPI Comments: Presents with complaint of her typical vertigo dizziness causing her to fall backwards into a door and now has pain in her back that radiates to her chest.  Patient states she hit her left back on the door and thinks this is what caused the pain. She states she feels short of breath. She states this is typical of her vertigo episodes. She reports taking her blood pressure medicine this morning. States she feels like she can't take a deep breath because her back hurts. Patient is a 80 y.o. female presenting with dizziness and fall. The history is provided by the patient. Dizziness   This is a chronic problem. The current episode started 3 to 5 hours ago. The problem has not changed since onset. There was no focality noted. Primary symptoms include loss of balance. Pertinent negatives include no focal weakness, no slurred speech and no mental status change. There has been no fever. Associated symptoms include shortness of breath and chest pain. Pertinent negatives include no vomiting, no altered mental status and no nausea. Associated medical issues include CVA. Fall   Pertinent negatives include no fever, no nausea and no vomiting.         Past Medical History:   Diagnosis Date    ASCAD - (unspecified) 6/28/2016    Chest pain      unspecified    Diabetes mellitus ([facility] [address]     HTN - controlled, benign 6/28/2016    Lipid - Hyperlipidemia other unsp Dyslipidemia 6/28/2016    Observation for suspected cardiovascular disease     Respiratory insufficiency      distress       Past Surgical History:   Procedure Laterality Date    Hx heart catheterization  3/31/08    Hx coronary stent placement      Hx cholecystectomy      Hx hysterectomy      Hx orthopaedic Left      heel spur         Family History:   Problem Relation Age of Onset    Colon Cancer Other     Stomach Cancer Other     Cancer Other      lung    Cancer Sister      Colon    Cancer Brother      stomach    Cancer Brother Colon    Heart Disease Sister        Social History     Social History    Marital status:      Spouse name: N/A    Number of children: N/A    Years of education: N/A     Occupational History    Not on file. Social History Main Topics    Smoking status: Never Smoker    Smokeless tobacco: Never Used    Alcohol use No    Drug use: Yes     Special: Prescription    Sexual activity: Not on file     Other Topics Concern    Not on file     Social History Narrative    Previously employed as a . Exposure History:         Birds:  no    Asbestos: no      TB: no    Hot Tubs/Humidifier: no                 ALLERGIES: Albuterol; Codeine; and Demerol [meperidine]    Review of Systems   Constitutional: Negative for chills and fever. Respiratory: Positive for shortness of breath. Cardiovascular: Positive for chest pain. Gastrointestinal: Negative for nausea and vomiting. Neurological: Positive for dizziness and loss of balance. Negative for focal weakness. All other systems reviewed and are negative. Vitals:    01/21/17 1552   BP: (!) 210/109   Pulse: 84   Resp: 16   Temp: 98.9 °F (37.2 °C)   SpO2: 94%   Weight: 61.2 kg (135 lb)   Height: 5' (1.524 m)            Physical Exam   Constitutional: She is oriented to person, place, and time. She appears well-developed and well-nourished. No distress. HENT:   Head: Normocephalic and atraumatic. Neck: Normal range of motion. Neck supple. Cardiovascular: Normal rate and regular rhythm. Pulmonary/Chest: Effort normal. No respiratory distress. She has no wheezes. She has no rales. Abdominal: Soft. She exhibits no distension. There is no tenderness. There is no rebound and no guarding. Musculoskeletal:        Back:    Neurological: She is alert and oriented to person, place, and time. No cranial nerve deficit. Skin: Skin is warm and dry. No rash noted. She is not diaphoretic. No erythema.    Psychiatric: She has a normal mood and affect. Her behavior is normal.   Nursing note and vitals reviewed. MDM  Number of Diagnoses or Management Options  Diagnosis management comments: My plan was to see how fentanyl/meclizine affected her BP and then tx BP if pain improved but HTN persisted. She was taken to radiology for xray and ct and was gone for a LONG time. When she came back she was walked to the BR having worse CP and started vomiting, still htn, and nursing reports very unsteady with ambulation and dizzy. Given hydralizine. She was clutching her chest and looked much worse than when she left. Repeat EKG and Troponin. EKG unchanged but Tn neg. After zofran and BP reduced she looked much better. She was given ASA and nitro paste. When I spoke to again she reports back pain with movement started immediately after fall (1030) but developed CP/SHOB at 1430 and that's when she decided to come in. D/w Dr. Soha Leavitt. Amount and/or Complexity of Data Reviewed  Clinical lab tests: ordered and reviewed  Review and summarize past medical records: yes (Cath in 2008 last cath.   Follows reg with Dr. Javier Sevilla, no recent sx saw pulm for pinon in aug)  Discuss the patient with other providers: yes  Independent visualization of images, tracings, or specimens: yes (NSR, nonspecific Twave changes, nl QRS, not significantly changed from previous)    Risk of Complications, Morbidity, and/or Mortality  Presenting problems: high  Diagnostic procedures: moderate  Management options: high      ED Course       Procedures

## 2017-01-21 NOTE — IP AVS SNAPSHOT
Current Discharge Medication List  
  
Take these medications at their scheduled times Dose & Instructions Dispensing Information Comments Morning Noon Evening Bedtime  
 allopurinol 100 mg tablet Commonly known as:  Edd Knapp Your next dose is: Today, Tomorrow Other:  ____________  
   
   
 daily. Refills:  0  
     
   
   
   
  
 amLODIPine 10 mg tablet Commonly known as:  Dio Dumas Your next dose is: Today, Tomorrow Other:  ____________  
   
   
 daily. Refills:  0  
     
   
   
   
  
 ascorbic acid (vitamin C) 500 mg tablet Commonly known as:  VITAMIN C Your next dose is: Today, Tomorrow Other:  ____________ Dose:  500 mg Take 1 Tab by mouth two (2) times a day. Quantity:  180 Tab Refills:  3  
     
   
   
   
  
 aspirin delayed-release 81 mg tablet Your next dose is: Today, Tomorrow Other:  ____________ Take  by mouth daily. Refills:  0  
     
   
   
   
  
 ferrous sulfate 325 mg (65 mg iron) tablet Your next dose is: Today, Tomorrow Other:  ____________ Dose:  325 mg Take 1 Tab by mouth Daily (before breakfast). Quantity:  90 Tab Refills:  3  
     
   
   
   
  
 folic acid 1 mg tablet Commonly known as:  Google Your next dose is: Today, Tomorrow Other:  ____________ Take  by mouth daily. Refills:  0  
     
   
   
   
  
 furosemide 20 mg tablet Commonly known as:  LASIX Your next dose is: Today, Tomorrow Other:  ____________ Dose:  20 mg Take 1 Tab by mouth daily. Quantity:  30 Tab Refills:  5  
     
   
   
   
  
 levothyroxine 100 mcg tablet Commonly known as:  SYNTHROID Your next dose is: Today, Tomorrow Other:  ____________  
   
   
 daily. Refills:  0  
     
   
   
   
  
 losartan 100 mg tablet Commonly known as:  COZAAR  
   
 Your next dose is: Today, Tomorrow Other:  ____________ Dose:  100 mg Take 1 Tab by mouth daily. Quantity:  90 Tab Refills:  3  
     
   
   
   
  
 metFORMIN 500 mg tablet Commonly known as:  GLUCOPHAGE Your next dose is: Today, Tomorrow Other:  ____________ Take  by mouth two (2) times daily (with meals). Refills:  0  
     
   
   
   
  
 potassium chloride 10 mEq tablet Commonly known as:  K-DUR, KLOR-CON Your next dose is: Today, Tomorrow Other:  ____________  
   
   
 daily. Refills:  0  
     
   
   
   
  
 pravastatin 20 mg tablet Commonly known as:  PRAVACHOL Your next dose is: Today, Tomorrow Other:  ____________ Dose:  40 mg Take 2 Tabs by mouth nightly. Quantity:  90 Tab Refills:  3 VITAMIN B-12 1,000 mcg tablet Generic drug:  cyanocobalamin Your next dose is: Today, Tomorrow Other:  ____________ Dose:  1000 mcg Take 1,000 mcg by mouth daily. Refills:  0 Take these medications as needed Dose & Instructions Dispensing Information Comments Morning Noon Evening Bedtime  
 meclizine 25 mg tablet Commonly known as:  ANTIVERT Your next dose is: Today, Tomorrow Other:  ____________  
   
   
 three (3) times daily as needed. Refills:  0  
     
   
   
   
  
 nitroglycerin 0.4 mg SL tablet Commonly known as:  NITROSTAT Your next dose is: Today, Tomorrow Other:  ____________  
   
   
 by SubLINGual route every five (5) minutes as needed for Chest Pain. Refills:  0  
     
   
   
   
  
 promethazine 25 mg tablet Commonly known as:  PHENERGAN Your next dose is: Today, Tomorrow Other:  ____________ Dose:  25 mg Take 1 Tab by mouth every six (6) hours as needed for Nausea. Quantity:  24 Tab Refills:  3 traMADol 50 mg tablet Commonly known as:  ULTRAM  
   
Your next dose is: Today, Tomorrow Other:  ____________ Dose:  50 mg Take 50 mg by mouth every eight (8) hours as needed for Pain. Refills:  0 Take these medications as directed Dose & Instructions Dispensing Information Comments Morning Noon Evening Bedtime ADVAIR DISKUS 250-50 mcg/dose diskus inhaler Generic drug:  fluticasone-salmeterol Your next dose is: Today, Tomorrow Other:  ____________ Refills:  0  
     
   
   
   
  
 clopidogrel 75 mg Tab Commonly known as:  PLAVIX Your next dose is: Today, Tomorrow Other:  ____________ Refills:  0 PRESERVISION AREDS 2 PO Your next dose is: Today, Tomorrow Other:  ____________ Take  by mouth. Refills:  0

## 2017-01-21 NOTE — IP AVS SNAPSHOT
Summary of Care Report The Summary of Care report has been created to help improve care coordination. Users with access to [employer] or [address] (Web-based application) may access additional patient information including the Discharge Summary. If you are not currently a [address] user and need more information, please call the number listed below in the [place] [address] section and ask to be connected with Medical Records. Facility Information Name Address Phone [address] [address] [address] [phone] Patient Information Patient Name Sex  Mouna Santos ([id number]) Female 1929 Discharge Information Admitting Provider Service Area Unit Raphael Fontanez MD / Rashad [place] [address] Clinical Obs / [phone] Discharge Provider Discharge Date/Time Discharge Disposition Destination (none) 2017 Morning (Pending) AHR (none) Patient Language Language ENGLISH [13] Problem List as of 2017  Date Reviewed: 2016 Codes Priority Class Noted - Resolved Lipid - Hyperlipidemia other unsp Dyslipidemia ICD-10-CM: E78.5 ICD-9-CM: 272.4   2016 - Present HTN - controlled, benign ICD-10-CM: I10 
ICD-9-CM: 401.1   2016 - Present ASCAD - (unspecified) ICD-10-CM: I25.10 ICD-9-CM: 414.01   2016 - Present Dyspnea ICD-10-CM: R06.00 
ICD-9-CM: 786.09   2016 - Present You are allergic to the following Allergen Reactions Albuterol Other (comments) tremors Codeine Unknown (comments) Demerol (Meperidine) Nausea and Vomiting Current Discharge Medication List  
  
CONTINUE these medications which have NOT CHANGED Dose & Instructions Dispensing Information Comments ADVAIR DISKUS 250-50 mcg/dose diskus inhaler Generic drug:  fluticasone-salmeterol Refills:  0  
   
 allopurinol 100 mg tablet Commonly known as:  ZYLOPRIM  
 daily. Refills:  0  
   
 amLODIPine 10 mg tablet Commonly known as:  NORVASC  
 daily. Refills:  0  
   
 ascorbic acid (vitamin C) 500 mg tablet Commonly known as:  VITAMIN C Dose:  500 mg Take 1 Tab by mouth two (2) times a day. Quantity:  180 Tab Refills:  3  
   
 aspirin delayed-release 81 mg tablet Take  by mouth daily. Refills:  0  
   
 clopidogrel 75 mg Tab Commonly known as:  PLAVIX Refills:  0  
   
 ferrous sulfate 325 mg (65 mg iron) tablet Dose:  325 mg Take 1 Tab by mouth Daily (before breakfast). Quantity:  90 Tab Refills:  3  
   
 folic acid 1 mg tablet Commonly known as:  Google Take  by mouth daily. Refills:  0  
   
 furosemide 20 mg tablet Commonly known as:  LASIX Dose:  20 mg Take 1 Tab by mouth daily. Quantity:  30 Tab Refills:  5  
   
 levothyroxine 100 mcg tablet Commonly known as:  SYNTHROID  
 daily. Refills:  0  
   
 losartan 100 mg tablet Commonly known as:  COZAAR Dose:  100 mg Take 1 Tab by mouth daily. Quantity:  90 Tab Refills:  3  
   
 meclizine 25 mg tablet Commonly known as:  ANTIVERT  
 three (3) times daily as needed. Refills:  0  
   
 metFORMIN 500 mg tablet Commonly known as:  GLUCOPHAGE Take  by mouth two (2) times daily (with meals). Refills:  0  
   
 nitroglycerin 0.4 mg SL tablet Commonly known as:  NITROSTAT  
 by SubLINGual route every five (5) minutes as needed for Chest Pain. Refills:  0  
   
 potassium chloride 10 mEq tablet Commonly known as:  K-DUR, KLOR-CON  
 daily. Refills:  0  
   
 pravastatin 20 mg tablet Commonly known as:  PRAVACHOL Dose:  40 mg Take 2 Tabs by mouth nightly. Quantity:  90 Tab Refills:  3 PRESERVISION AREDS 2 PO Take  by mouth. Refills:  0  
   
 promethazine 25 mg tablet Commonly known as:  PHENERGAN Dose:  25 mg Take 1 Tab by mouth every six (6) hours as needed for Nausea. Quantity:  24 Tab Refills:  3  
   
 traMADol 50 mg tablet Commonly known as:  ULTRAM  
 Dose:  50 mg Take 50 mg by mouth every eight (8) hours as needed for Pain. Refills:  0  
   
 VITAMIN B-12 1,000 mcg tablet Generic drug:  cyanocobalamin Dose:  1000 mcg Take 1,000 mcg by mouth daily. Refills:  0 Follow-up Information Follow up With Details Comments Contact Info Della Gold MD In 1 week Call for appointment on Monday [id number] [facility] [place] [address] [address] [place] [address] 
[phone] Corona Lockett MD  1-2 weeks (office [facility] call [facility] app[facility]) [facility] [address] [address] 
[phone] Discharge Instructions DISCHARGE SUMMARY from Nurse The following personal items are in your possession at time of discharge: 
 
Dental Appliances: Lowers, Uppers Visual Aid: Glasses, With patient Home Medications: None Jewelry: None Clothing: None Other Valuables: None PATIENT INSTRUCTIONS: 
 
 
F-face looks uneven A-arms unable to move or move unevenly S-speech slurred or non-existent T-time-call 911 as soon as signs and symptoms begin-DO NOT go Back to bed or wait to see if you get better-TIME IS BRAIN. Warning Signs of HEART ATTACK Call 911 if you have these symptoms: 
? Chest discomfort. Most heart attacks involve discomfort in the center of the chest that lasts more than a few minutes, or that goes away and comes back. It can feel like uncomfortable pressure, squeezing, fullness, or pain. ? Discomfort in other areas of the upper body. Symptoms can include pain or discomfort in one or both arms, the back, neck, jaw, or stomach. ? Shortness of breath with or without chest discomfort. ? Other signs may include breaking out in a cold sweat, nausea, or lightheadedness. Don't wait more than five minutes to call [address]! Fast action can save your life. Calling 911 is almost always the fastest way to get lifesaving treatment. Emergency Medical Services staff can begin treatment when they arrive  up to an hour sooner than if someone gets to the hospital by car. The discharge information has been reviewed with the patient. The patient verbalized understanding. Discharge medications reviewed with the patient and appropriate educational materials and side effects teaching were provided. Chest Pain: Care Instructions Your Care Instructions There are many things that can cause chest pain. Some are not serious and will get better on their own in a few days. But some kinds of chest pain need more testing and treatment. Your doctor may have recommended a follow-up visit in the next 8 to 12 hours. If you are not getting better, you may need more tests or treatment. Even though your doctor has released you, you still need to watch for any problems. The doctor carefully checked you, but sometimes problems can develop later. If you have new symptoms or if your symptoms do not get better, get medical care right away. If you have worse or different chest pain or pressure that lasts more than 5 minutes or you passed out (lost consciousness), call 911 or seek other emergency help right away. A medical visit is only one step in your treatment. Even if you feel better, you still need to do what your doctor recommends, such as going to all suggested follow-up appointments and taking medicines exactly as directed. This will help you recover and help prevent future problems. How can you care for yourself at home? · Rest until you feel better. · Take your medicine exactly as prescribed. Call your doctor if you think you are having a problem with your medicine. · Do not drive after taking a prescription pain medicine. When should you call for help? Call 911 if: 
· You passed out (lost consciousness). · You have severe difficulty breathing. · You have symptoms of a heart attack. These may include: ¨ Chest pain or pressure, or a strange feeling in your chest. 
¨ Sweating. ¨ Shortness of breath. ¨ Nausea or vomiting. ¨ Pain, pressure, or a strange feeling in your back, neck, jaw, or upper belly or in one or both shoulders or arms. ¨ Lightheadedness or sudden weakness. ¨ A fast or irregular heartbeat. After you call 911, the  may tell you to chew 1 adult-strength or 2 to 4 low-dose aspirin. Wait for an ambulance. Do not try to drive yourself. Call your doctor today if: 
· You have any trouble breathing. · Your chest pain gets worse. · You are dizzy or lightheaded, or you feel like you may faint. · You are not getting better as expected. · You are having new or different chest pain. Where can you learn more? Go to http://saul-mil.info/. Enter A120 in the search box to learn more about \"Chest Pain: Care Instructions. \" Current as of: May 27, 2016 Content Version: 11.1 © 5995-0350 [employer]. Care instructions adapted under license by [employer] (which disclaims liability or warranty for this information). If you have questions about a medical condition or this instruction, always ask your healthcare professional. [place] [address] any warranty or liability for your use of this information. Chart Review Routing History Recipient Method Report Sent By Kiara Noonan MD  
Fax: [phone] Phone: [phone] Fax  Cruz Villalobos MD [[id number]] 12/11/2014 12:01 PM 12/11/2014 [address] Comm Summary Cruz Villalobos MD [[id number]] 12/11/2014 12:01 PM 12/11/2014 Latricia Huff MD  
Fax: [phone] Phone: [phone] Fax Media Anjelica Paige [[id number]] 7/8/2016 10:57 AM 07/08/2016 Latricia Huff MD  
Fax: [phone] Phone: [phone] Fax Note Review Doris Jhaveri [[id number]] 7/21/2016  2:19 PM 07/21/2016

## 2017-01-21 NOTE — IP AVS SNAPSHOT
Corey Hickey 
 
 
 [address] [facility] [address] 
[phone] Patient: Alana Reed MRN: [id number] You are allergic to the following Allergen Reactions Albuterol Other (comments) tremors Codeine Unknown (comments) Demerol (Meperidine) Nausea and Vomiting Recent Documentation Height Weight BMI Smoking Status 1.524 m 65.5 kg 28.18 kg/m2 Never Smoker Emergency Contacts Name Discharge Info Relation Home Work Mobile Harini Bee  Child [2] [phone] About your hospitalization You were admitted on:  2017 You last received care in the:  [facility] 3 CLINICAL OBSERVATION You were discharged on:  2017 Unit phone number:  [phone] Why you were hospitalized Your primary diagnosis was:  Not on File Providers Seen During Your Hospitalizations Provider Role Specialty Primary office phone Carmine Anne MD Attending Provider Emergency Medicine [phone] Catina Lopez MD Attending Provider Cardiology [phone] Your Primary Care Physician (PCP) Primary Care Physician Office Phone Office Fax Gordon Juarez [phone] Follow-up Information Follow up With Details Comments Contact Info Jennifer Smalls MD In 1 week Call for appointment on Monday  [address] [address] [phone] Leighton Aranda MD  1-2 weeks (office will call on [facility] with appt) [address] [address] 
[phone] Current Discharge Medication List  
  
CONTINUE these medications which have NOT CHANGED Dose & Instructions Dispensing Information Comments Morning Noon Evening Bedtime ADVAIR DISKUS 250-50 mcg/dose diskus inhaler Generic drug:  fluticasone-salmeterol Your next dose is: Today, Tomorrow Other:  _________ Refills:  0  
     
   
   
   
  
 allopurinol 100 mg tablet Commonly known as:  Maryam Rosado Your next dose is: Today, Tomorrow Other:  _________  
   
   
 daily. Refills:  0  
     
   
   
   
  
 amLODIPine 10 mg tablet Commonly known as:  Maryanon Salle Your next dose is: Today, Tomorrow Other:  _________  
   
   
 daily. Refills:  0  
     
   
   
   
  
 ascorbic acid (vitamin C) 500 mg tablet Commonly known as:  VITAMIN C Your next dose is: Today, Tomorrow Other:  _________ Dose:  500 mg Take 1 Tab by mouth two (2) times a day. Quantity:  180 Tab Refills:  3  
     
   
   
   
  
 aspirin delayed-release 81 mg tablet Your next dose is: Today, Tomorrow Other:  _________ Take  by mouth daily. Refills:  0  
     
   
   
   
  
 clopidogrel 75 mg Tab Commonly known as:  PLAVIX Your next dose is: Today, Tomorrow Other:  _________ Refills:  0  
     
   
   
   
  
 ferrous sulfate 325 mg (65 mg iron) tablet Your next dose is: Today, Tomorrow Other:  _________ Dose:  325 mg Take 1 Tab by mouth Daily (before breakfast). Quantity:  90 Tab Refills:  3  
     
   
   
   
  
 folic acid 1 mg tablet Commonly known as:  Google Your next dose is: Today, Tomorrow Other:  _________ Take  by mouth daily. Refills:  0  
     
   
   
   
  
 furosemide 20 mg tablet Commonly known as:  LASIX Your next dose is: Today, Tomorrow Other:  _________ Dose:  20 mg Take 1 Tab by mouth daily. Quantity:  30 Tab Refills:  5  
     
   
   
   
  
 levothyroxine 100 mcg tablet Commonly known as:  SYNTHROID Your next dose is: Today, Tomorrow Other:  _________  
   
   
 daily. Refills:  0  
     
   
   
   
  
 losartan 100 mg tablet Commonly known as:  COZAAR Your next dose is: Today, Tomorrow Other:  _________ Dose:  100 mg Take 1 Tab by mouth daily. Quantity:  90 Tab Refills:  3  
     
   
   
   
  
 meclizine 25 mg tablet Commonly known as:  ANTIVERT Your next dose is: Today, Tomorrow Other:  _________  
   
   
 three (3) times daily as needed. Refills:  0  
     
   
   
   
  
 metFORMIN 500 mg tablet Commonly known as:  GLUCOPHAGE Your next dose is: Today, Tomorrow Other:  _________ Take  by mouth two (2) times daily (with meals). Refills:  0  
     
   
   
   
  
 nitroglycerin 0.4 mg SL tablet Commonly known as:  NITROSTAT Your next dose is: Today, Tomorrow Other:  _________  
   
   
 by SubLINGual route every five (5) minutes as needed for Chest Pain. Refills:  0  
     
   
   
   
  
 potassium chloride 10 mEq tablet Commonly known as:  K-DUR, KLOR-CON Your next dose is: Today, Tomorrow Other:  _________  
   
   
 daily. Refills:  0  
     
   
   
   
  
 pravastatin 20 mg tablet Commonly known as:  PRAVACHOL Your next dose is: Today, Tomorrow Other:  _________ Dose:  40 mg Take 2 Tabs by mouth nightly. Quantity:  90 Tab Refills:  3 PRESERVISION AREDS 2 PO Your next dose is: Today, Tomorrow Other:  _________ Take  by mouth. Refills:  0  
     
   
   
   
  
 promethazine 25 mg tablet Commonly known as:  PHENERGAN Your next dose is: Today, Tomorrow Other:  _________ Dose:  25 mg Take 1 Tab by mouth every six (6) hours as needed for Nausea. Quantity:  24 Tab Refills:  3  
     
   
   
   
  
 traMADol 50 mg tablet Commonly known as:  ULTRAM  
   
Your next dose is: Today, Tomorrow Other:  _________ Dose:  50 mg Take 50 mg by mouth every eight (8) hours as needed for Pain. Refills:  0  
     
   
   
   
  
 VITAMIN B-12 1,000 mcg tablet Generic drug:  cyanocobalamin Your next dose is: Today, Tomorrow Other:  _________ Dose:  1000 mcg Take 1,000 mcg by mouth daily. Refills:  0 Discharge Instructions DISCHARGE SUMMARY from Nurse The following personal items are in your possession at time of discharge: 
 
Dental Appliances: Lowers, Uppers Visual Aid: Glasses, With patient Home Medications: None Jewelry: None Clothing: None Other Valuables: None PATIENT INSTRUCTIONS: 
 
 
F-face looks uneven A-arms unable to move or move unevenly S-speech slurred or non-existent T-time-call 911 as soon as signs and symptoms begin-DO NOT go Back to bed or wait to see if you get better-TIME IS BRAIN. Warning Signs of HEART ATTACK Call 911 if you have these symptoms: 
? Chest discomfort. Most heart attacks involve discomfort in the center of the chest that lasts more than a few minutes, or that goes away and comes back. It can feel like uncomfortable pressure, squeezing, fullness, or pain. ? Discomfort in other areas of the upper body. Symptoms can include pain or discomfort in one or both arms, the back, neck, jaw, or stomach. ? Shortness of breath with or without chest discomfort. ? Other signs may include breaking out in a cold sweat, nausea, or lightheadedness. Don't wait more than five minutes to call [address]! Fast action can save your life. Calling 911 is almost always the fastest way to get lifesaving treatment. Emergency Medical Services staff can begin treatment when they arrive  up to an hour sooner than if someone gets to the hospital by car. The discharge information has been reviewed with the patient. The patient verbalized understanding. Discharge medications reviewed with the patient and appropriate educational materials and side effects teaching were provided. Chest Pain: Care Instructions Your Care Instructions There are many things that can cause chest pain. Some are not serious and will get better on their own in a few days. But some kinds of chest pain need more testing and treatment. Your doctor may have recommended a follow-up visit in the next 8 to 12 hours. If you are not getting better, you may need more tests or treatment. Even though your doctor has released you, you still need to watch for any problems. The doctor carefully checked you, but sometimes problems can develop later. If you have new symptoms or if your symptoms do not get better, get medical care right away. If you have worse or different chest pain or pressure that lasts more than 5 minutes or you passed out (lost consciousness), call 911 or seek other emergency help right away. A medical visit is only one step in your treatment. Even if you feel better, you still need to do what your doctor recommends, such as going to all suggested follow-up appointments and taking medicines exactly as directed. This will help you recover and help prevent future problems. How can you care for yourself at home? · Rest until you feel better. · Take your medicine exactly as prescribed. Call your doctor if you think you are having a problem with your medicine. · Do not drive after taking a prescription pain medicine. When should you call for help? Call 911 if: 
· You passed out (lost consciousness). · You have severe difficulty breathing. · You have symptoms of a heart attack. These may include: ¨ Chest pain or pressure, or a strange feeling in your chest. 
¨ Sweating. ¨ Shortness of breath. ¨ Nausea or vomiting. ¨ Pain, pressure, or a strange feeling in your back, neck, jaw, or upper belly or in one or both shoulders or arms. ¨ Lightheadedness or sudden weakness. ¨ A fast or irregular heartbeat. After you call 911, the  may tell you to chew 1 adult-strength or 2 to 4 low-dose aspirin. Wait for an ambulance. Do not try to drive yourself. Call your doctor today if: 
· You have any trouble breathing. · Your chest pain gets worse. · You are dizzy or lightheaded, or you feel like you may faint. · You are not getting better as expected. · You are having new or different chest pain. Where can you learn more? Go to http://saul-mil.info/. Enter A120 in the search box to learn more about \"Chest Pain: Care Instructions. \" Current as of: May 27, 2016 Content Version: 11.1 © 2461-4229 [employer]. Care instructions adapted under license by [employer] (which disclaims liability or warranty for this information). If you have questions about a medical condition or this instruction, always ask your healthcare professional. [place] [address] any warranty or liability for your use of this information. Discharge Orders None ACO Transitions of Care Introducing [employer] [address] offers a voluntary care coordination program to provide high quality service and care to [facility] fee-for-service beneficiaries. Anne Walker was designed to help you enhance your health and well-being through the following services: ? Transitions of Care  support for individuals who are transitioning from one care setting to another (example: Hospital to home). ? Chronic and Complex Care Coordination  support for individuals and caregivers of those with serious or chronic illnesses or with more than one chronic (ongoing) condition and those who take a number of different medications. If you meet specific medical criteria, a [facility] [address] may call you directly to coordinate your care with your primary care physician and your other care providers. For questions about the [facility] programs, please, contact your physicians office. For general questions or additional information about Accountable Care Organizations: 
Please visit www.medicare.gov/acos. html or call 1-800-MEDICARE (2-359.306.5327) TTY users should call 5-111.817.6319. [employer] Announcement We are excited to announce that we are making your provider's discharge notes available to you in Video Passports. You will see these notes when they are completed and signed by the physician that discharged you from your recent hospital stay. If you have any questions or concerns about any information you see in Video Passports, please call the Health Information Department where you were seen or reach out to your Primary Care Provider for more information about your plan of care. Introducing [facility]! Lise Velasco introduces Video Passports patient portal. Now you can access parts of your medical record, email your doctor's office, and request medication refills online. 1. In your internet browser, go to [email or web address] 2. Click on the First Time User? Click Here link in the Sign In box. You will see the New Member Sign Up page. 3. Enter your [employer] Pass[employer] Access Code exactly as it appears below. You will not need to use this code after youve completed the sign-up process. If you do not sign up before the expiration date, you must request a new code. · [employer] Passports Access Code: [email or web address] Expires: 3/12/2017  1:59 PM 
 
4. Enter the last four digits of your Social Security Number (xxxx) and Date of Birth (mm/dd/yyyy) as indicated and click Submit. You will be taken to the next sign-up page. 5. Create a Video Passports ID.  This will be your Video Passports login ID and cannot be changed, so think of one that is secure and easy to remember. 6. Create a [employer]! password. You can change your password at any time. 7. Enter your Password Reset Question and Answer. This can be used at a later time if you forget your password. 8. Enter your e-mail address. You will receive e-mail notification when new information is available in [address] 9. Click Sign Up. You can now view and download portions of your medical record. 10. Click the Download Summary menu link to download a portable copy of your medical information. If you have questions, please visit the Frequently Asked Questions section of the Book[employer] website. Remember, BookitNow! is NOT to be used for urgent needs. For medical emergencies, dial 911. Now available from your iPhone and Android! General Information Please provide this summary of care documentation to your next provider. Patient Signature:  ____________________________________________________________ Date:  ____________________________________________________________  
  
J Luis Ryder Provider Signature:  ____________________________________________________________ Date:  ____________________________________________________________

## 2017-01-22 NOTE — PROGRESS NOTES
Bedside and Verbal shift change report given to Paco Funes RN (oncoming nurse) by self Cam Duran nurse). Report included the following information SBAR, Kardex, MAR and Recent Results.
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
## 2017-01-22 NOTE — PROGRESS NOTES
[facility] CARDIOLOGY PROGRESS NOTE           1/22/2017 9:24 AM    Admit Date: 1/21/2017      Subjective:   No cp    ROS:  Cardiovascular:  As noted above    Objective:      Vitals:    01/22/17 0047 01/22/17 0102 01/22/17 0446 01/22/17 0829   BP: 116/59 139/79 123/60 144/75   Pulse: 74 81 70 66   Resp: 16  16 18   Temp: 98.7 °F (37.1 °C)  97.3 °F (36.3 °C) 97.3 °F (36.3 °C)   SpO2: 94%  93% 94%   Weight:   65.5 kg (144 lb 4.8 oz)    Height:           Physical Exam:  General-No Acute Distress  Neck- supple, no JVD  CV- regular rate and rhythm no MRG  Lung- clear bilaterally  Abd- soft, nontender, nondistended  Ext- no edema bilaterally. Skin- warm and dry    Data Review:   Recent Labs      01/21/17   1930  01/21/17   1600   NA   --   143   K   --   4.3   BUN   --   18   CREA   --   0.91   GLU   --   111*   WBC   --   12.0*   HGB   --   11.0*   HCT   --   34.8*   PLT   --   219   TROIQ  0.93*  0.04     Ekg: pending    Assessment/Plan:     Doing well. No cardiac c/o. Home today.     Jimmy Fuchs MD  1/22/2017 9:24 AM

## 2017-01-22 NOTE — PROGRESS NOTES
Admit database completed with patients daughter - unable to complete PTA Med List - daughter states she will bring updated list in the am.

## 2017-01-22 NOTE — ROUTINE PROCESS
TRANSFER - OUT REPORT:    Verbal report given to Shilpi Quinteros 15 on Janett Diaz  being transferred to [facility] room 327 for routine progression of care       Report consisted of patients Situation, Background, Assessment and   Recommendations(SBAR). Information from the following report(s) SBAR, ED Summary, Recent Results and Cardiac Rhythm NSR was reviewed with the receiving nurse. Lines:   Peripheral IV 01/21/17 Left Antecubital (Active)   Site Assessment Clean, dry, & intact 1/21/2017  7:41 PM   Phlebitis Assessment 0 1/21/2017  7:41 PM   Infiltration Assessment 0 1/21/2017  7:41 PM   Dressing Status Clean, dry, & intact 1/21/2017  7:41 PM       Peripheral IV 01/21/17 Right Antecubital (Active)   Site Assessment Clean, dry, & intact 1/21/2017  7:42 PM   Phlebitis Assessment 0 1/21/2017  7:42 PM   Infiltration Assessment 0 1/21/2017  7:42 PM   Dressing Status Clean, dry, & intact 1/21/2017  7:42 PM        Opportunity for questions and clarification was provided.       Patient transported with:   Monitor  O2 @ 2 liters  Registered Nurse

## 2017-01-22 NOTE — PROGRESS NOTES
Report received from Shaun, [facility].

## 2017-01-22 NOTE — PROGRESS NOTES
Bedside and Verbal shift change report given to self (oncoming nurse) by Bal House RN (offgoing nurse).  Report included the following information SBAR, Kardex, ED Summary, Procedure Summary, MAR, Recent Results and Cardiac Rhythm SR.

## 2017-01-22 NOTE — DISCHARGE SUMMARY
[address] Cardiology Discharge Summary     Patient ID:  Rhett Shelton  [id number]  1 y.o.  7/17/1929    Admit date: 1/21/2017    Discharge date and time:  1-    Admitting Physician: Kelsey Ellis MD     Discharge Physician: Oswaldo Giordano PA-C/[facility]    Admission Diagnoses: Elevated troponin    Discharge Diagnoses:   Patient Active Problem List    Diagnosis Date Noted    Dyspnea 07/06/2016    Lipid - Hyperlipidemia other unsp Dyslipidemia 06/28/2016    HTN - controlled, benign 06/28/2016    ASCAD - (unspecified) 06/28/2016       Hospital Course: Pt is an 80-year-old lady admitted for overnight observation at the request of the emergency room physician for mild elevation of her serum troponin up to 0.93. For the last year, she has had problems with chronic recurrent vertigo and frequent falls. They seem to be worsening. Yesterday, she got up as usual. She got her walker and had vertigo and fell into the closet door. On arrival to the emergency room, she was severely hypertensive with a blood pressure of 210/109, and a pulse of 84. Her initial evaluation was to be sure there was not any fracture or stroke. She had a chest x-ray, which showed no bony fracture. She had a CTA of the chest, which showed no pulmonary embolism. 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 Part of her admission to the ER blood work included a troponin, which went from 0.4 up to 0.93. She has not been having any angina. She has not had a change in her nitroglycerin pattern usage. She does have a history of coronary artery disease and prior stent placement. She was monitored overnight w/o CP. She was seen and examined by Dr. [facility] and determined stable and ready for discharge. She will be scheduled f/u in 1-2 weeks with Dr. Robby Jenkins. DISPOSITION: The patient is being discharged home in stable condition on a low saturated fat, low cholesterol and low salt diet.  Pt is instructed to advance activities as tolerated limited to fatigue or shortness of breath. Pt is instructed to call office or return to ER for immediate evaluation of any shortness of breath or chest pain. Follow up with [facility] Cardiology Dr. Aquilino Muir (office will call on Monday with f/u appt)  Follow up with PCP Dr. Judi Vargas in 1-2 weeks    Discharge Exam:   Visit Vitals    /75 (BP 1 Location: Left arm, BP Patient Position: At rest)    Pulse 66    Temp 97.3 °F (36.3 °C)    Resp 18    Ht 5' (1.524 m)    Wt 65.5 kg (144 lb 4.8 oz)    SpO2 94%    BMI 28.18 kg/m2   Pt has been seen by Dr. Nico Hurtado: see his progress note for exam details. Recent Results (from the past 24 hour(s))   CBC WITH AUTOMATED DIFF    Collection Time: 01/21/17  4:00 PM   Result Value Ref Range    WBC 12.0 (H) 4.3 - 11.1 K/uL    RBC 4.08 4.05 - 5.25 M/uL    HGB 11.0 (L) 11.7 - 15.4 g/dL    HCT 34.8 (L) 35.8 - 46.3 %    MCV 85.3 79.6 - 97.8 FL    MCH 27.0 26.1 - 32.9 PG    MCHC 31.6 31.4 - 35.0 g/dL    RDW 17.2 (H) 11.9 - 14.6 %    PLATELET 667 122 - 925 K/uL    MPV 10.6 (L) 10.8 - 14.1 FL    NEUTROPHILS 83 (H) 47 - 75 %    BANDS 3 0 - 10 %    LYMPHOCYTES 8 (L) 16 - 44 %    MONOCYTES 6 3 - 9 %    ABS. NEUTROPHILS 10.3 (H) 1.7 - 8.2 K/UL    ABS. LYMPHOCYTES 1.0 0.5 - 4.6 K/UL    ABS. MONOCYTES 0.7 0.1 - 1.3 K/UL    RBC COMMENTS NORMOCYTIC/NORMOCHROMIC      PLATELET COMMENTS ADEQUATE      DF MANUAL     METABOLIC PANEL, COMPREHENSIVE    Collection Time: 01/21/17  4:00 PM   Result Value Ref Range    Sodium 143 136 - 145 mmol/L    Potassium 4.3 3.5 - 5.1 mmol/L    Chloride 109 (H) 98 - 107 mmol/L    CO2 27 21 - 32 mmol/L    Anion gap 7 7 - 16 mmol/L    Glucose 111 (H) 65 - 100 mg/dL    BUN 18 8 - 23 MG/DL    Creatinine 0.91 0.6 - 1.0 MG/DL    GFR est AA >60 >60 ml/min/1.73m2    GFR est non-AA >60 >60 ml/min/1.73m2    Calcium 8.4 8.3 - 10.4 MG/DL    Bilirubin, total 0.3 0.2 - 1.1 MG/DL    ALT 15 12 - 65 U/L    AST 38 (H) 15 - 37 U/L    Alk.  phosphatase 90 50 - 136 U/L    Protein, total 7.4 6.3 - 8.2 g/dL    Albumin 3.7 3.2 - 4.6 g/dL    Globulin 3.7 (H) 2.3 - 3.5 g/dL    A-G Ratio 1.0 (L) 1.2 - 3.5     TROPONIN I    Collection Time: 01/21/17  4:00 PM   Result Value Ref Range    Troponin-I, Qt. 0.04 0.02 - 0.05 NG/ML   BNP    Collection Time: 01/21/17  4:00 PM   Result Value Ref Range     pg/mL   D DIMER    Collection Time: 01/21/17  4:00 PM   Result Value Ref Range    D DIMER 3.52 (HH) <0.55 ug/ml(FEU)   POC TROPONIN-I    Collection Time: 01/21/17  5:49 PM   Result Value Ref Range    Troponin-I (POC) 0.29 (H) 0.0 - 0.08 ng/ml   URINE MICROSCOPIC    Collection Time: 01/21/17  5:50 PM   Result Value Ref Range    WBC 0-3 0 /hpf    RBC 0 0 /hpf    Epithelial cells 0-3 0 /hpf    Bacteria 0 0 /hpf    Casts 0-3 0 /lpf   GLUCOSE, POC    Collection Time: 01/21/17  5:52 PM   Result Value Ref Range    Glucose (POC) 118 (H) 65 - 100 mg/dL   TROPONIN I    Collection Time: 01/21/17  7:30 PM   Result Value Ref Range    Troponin-I, Qt. 0.93 (HH) 0.02 - 0.05 NG/ML         Patient Instructions:   Current Discharge Medication List      CONTINUE these medications which have NOT CHANGED    Details   losartan (COZAAR) 100 mg tablet Take 1 Tab by mouth daily. Qty: 90 Tab, Refills: 3    Associated Diagnoses: Benign essential HTN      promethazine (PHENERGAN) 25 mg tablet Take 1 Tab by mouth every six (6) hours as needed for Nausea. Qty: 24 Tab, Refills: 3    Associated Diagnoses: Nausea      pravastatin (PRAVACHOL) 20 mg tablet Take 2 Tabs by mouth nightly. Qty: 90 Tab, Refills: 3    Associated Diagnoses: Dyslipidemia      ferrous sulfate 325 mg (65 mg iron) tablet Take 1 Tab by mouth Daily (before breakfast). Qty: 90 Tab, Refills: 3    Associated Diagnoses: Iron deficiency anemia secondary to inadequate dietary iron intake      ascorbic acid, vitamin C, (VITAMIN C) 500 mg tablet Take 1 Tab by mouth two (2) times a day.   Qty: 180 Tab, Refills: 3    Associated Diagnoses: Iron deficiency anemia secondary to inadequate dietary iron intake      cyanocobalamin (VITAMIN B-12) 1,000 mcg tablet Take 1,000 mcg by mouth daily. VIT C/E/ZN/COPPR/LUTEIN/ZEAXAN (PRESERVISION AREDS 2 PO) Take  by mouth. nitroglycerin (NITROSTAT) 0.4 mg SL tablet by SubLINGual route every five (5) minutes as needed for Chest Pain.      levothyroxine (SYNTHROID) 100 mcg tablet daily. ADVAIR DISKUS 250-50 mcg/dose diskus inhaler       amLODIPine (NORVASC) 10 mg tablet daily. clopidogrel (PLAVIX) 75 mg tablet       meclizine (ANTIVERT) 25 mg tablet three (3) times daily as needed. allopurinol (ZYLOPRIM) 100 mg tablet daily. potassium chloride (K-DUR, KLOR-CON) 10 mEq tablet daily. folic acid (FOLVITE) 1 mg tablet Take  by mouth daily. metFORMIN (GLUCOPHAGE) 500 mg tablet Take  by mouth two (2) times daily (with meals). aspirin delayed-release 81 mg tablet Take  by mouth daily. traMADol (ULTRAM) 50 mg tablet Take 50 mg by mouth every eight (8) hours as needed for Pain. furosemide (LASIX) 20 mg tablet Take 1 Tab by mouth daily. Qty: 30 Tab, Refills: 5    Associated Diagnoses: HTN (hypertension), benign;  Atherosclerosis of native coronary artery of native heart without angina pectoris; Pure hypercholesterolemia               Signed:  Juju Manuel PA-C  1/22/2017  10:05 AM

## 2017-01-22 NOTE — PROGRESS NOTES
Problem: Falls - Risk of  Goal: *Absence of falls  Outcome: Progressing Towards Goal  Patient progressing towards goal with no falls on current admission. Patient without confusion, agitation, or sensory perception deficits. Patient has mostly steady gait on ambulation. Personal belongings are within reach. Bed is in the low and locked position with side rails up x2. Bed alarm activated. Red gripper socks to bilateral feet. Call light within reach and patient verbalizes understanding of use.

## 2017-01-22 NOTE — DISCHARGE INSTRUCTIONS
Fainting: Care Instructions  Your Care Instructions    When you faint, or pass out, you lose consciousness for a short time. A brief drop in blood flow to the brain often causes it. When you fall or lie down, more blood flows to your brain and you regain consciousness. Emotional stress, pain, or overheating--especially if you have been standing--can make you faint. In these cases, fainting is usually not serious. But fainting can be a sign of a more serious problem. Your doctor may want you to have more tests to rule out other causes. The treatment you need depends on the reason why you fainted. The doctor has checked you carefully, but problems can develop later. If you notice any problems or new symptoms, get medical treatment right away. Follow-up care is a key part of your treatment and safety. Be sure to make and go to all appointments, and call your doctor if you are having problems. It's also a good idea to know your test results and keep a list of the medicines you take. How can you care for yourself at home? · Drink plenty of fluids to prevent dehydration. If you have kidney, heart, or liver disease and have to limit fluids, talk with your doctor before you increase your fluid intake. When should you call for help? Call 911 anytime you think you may need emergency care. For example, call if:  · You have symptoms of a heart problem. These may include:  ¨ Chest pain or pressure. ¨ Severe trouble breathing. ¨ A fast or irregular heartbeat. ¨ Lightheadedness or sudden weakness. ¨ Coughing up pink, foamy mucus. ¨ Passing out. After you call 911, the  may tell you to chew 1 adult-strength or 2 to 4 low-dose aspirin. Wait for an ambulance. Do not try to drive yourself. · You have symptoms of a stroke. These may include:  ¨ Sudden numbness, tingling, weakness, or loss of movement in your face, arm, or leg, especially on only one side of your body. ¨ Sudden vision changes.   ¨ Sudden trouble speaking. ¨ Sudden confusion or trouble understanding simple statements. ¨ Sudden problems with walking or balance. ¨ A sudden, severe headache that is different from past headaches. · You passed out (lost consciousness) again. Watch closely for changes in your health, and be sure to contact your doctor if:  · You do not get better as expected. Where can you learn more? Go to http://saul-mil.info/. Enter H952 in the search box to learn more about \"Fainting: Care Instructions. \"  Current as of: May 27, 2016  Content Version: 11.1  © 8074-9313 [employer]. Care instructions adapted under license by [employer] (which disclaims liability or warranty for this information). If you have questions about a medical condition or this instruction, always ask your healthcare professional. [place] [address] any warranty or liability for your use of this information. DISCHARGE SUMMARY from Nurse    The following personal items are in your possession at time of discharge:    Dental Appliances: Lowers, Uppers  Visual Aid: Glasses, With patient     Home Medications: None  Jewelry: None  Clothing: None  Other Valuables: None             PATIENT INSTRUCTIONS:    After general anesthesia or intravenous sedation, for 24 hours or while taking prescription Narcotics:  · Limit your activities  · Do not drive and operate hazardous machinery  · Do not make important personal or business decisions  · Do  not drink alcoholic beverages  · If you have not urinated within 8 hours after discharge, please contact your surgeon on call.     Report the following to your surgeon:  · Excessive pain, swelling, redness or odor of or around the surgical area  · Temperature over 100.5  · Nausea and vomiting lasting longer than 4 hours or if unable to take medications  · Any signs of decreased circulation or nerve impairment to extremity: change in color, persistent  numbness, tingling, coldness or increase pain  · Any questions        What to do at Home:  Recommended activity: Activity as tolerated,     If you experience any of the following symptoms Chest pain, SOB, please follow up with [facility]. *  Please give a list of your current medications to your Primary Care Provider. *  Please update this list whenever your medications are discontinued, doses are      changed, or new medications (including over-the-counter products) are added. *  Please carry medication information at all times in case of emergency situations. These are general instructions for a healthy lifestyle:    No smoking/ No tobacco products/ Avoid exposure to second hand smoke    Surgeon General's Warning:  Quitting smoking now greatly reduces serious risk to your health. Obesity, smoking, and sedentary lifestyle greatly increases your risk for illness    A healthy diet, regular physical exercise & weight monitoring are important for maintaining a healthy lifestyle    You may be retaining fluid if you have a history of heart failure or if you experience any of the following symptoms:  Weight gain of 3 pounds or more overnight or 5 pounds in a week, increased swelling in our hands or feet or shortness of breath while lying flat in bed. Please call your doctor as soon as you notice any of these symptoms; do not wait until your next office visit. Recognize signs and symptoms of STROKE:    F-face looks uneven    A-arms unable to move or move unevenly    S-speech slurred or non-existent    T-time-call 911 as soon as signs and symptoms begin-DO NOT go       Back to bed or wait to see if you get better-TIME IS BRAIN. Warning Signs of HEART ATTACK     Call 911 if you have these symptoms:   Chest discomfort. Most heart attacks involve discomfort in the center of the chest that lasts more than a few minutes, or that goes away and comes back.  It can feel like uncomfortable pressure, squeezing, fullness, or pain.  Discomfort in other areas of the upper body. Symptoms can include pain or discomfort in one or both arms, the back, neck, jaw, or stomach.  Shortness of breath with or without chest discomfort.  Other signs may include breaking out in a cold sweat, nausea, or lightheadedness. Don't wait more than five minutes to call 911 - MINUTES MATTER! Fast action can save your life. Calling 911 is almost always the fastest way to get lifesaving treatment. Emergency Medical Services staff can begin treatment when they arrive -- up to an hour sooner than if someone gets to the hospital by car. The discharge information has been reviewed with the patient. The patient verbalized understanding. Discharge medications reviewed with the patient and appropriate educational materials and side effects teaching were provided. Chest Pain: Care Instructions  Your Care Instructions  There are many things that can cause chest pain. Some are not serious and will get better on their own in a few days. But some kinds of chest pain need more testing and treatment. Your doctor may have recommended a follow-up visit in the next 8 to 12 hours. If you are not getting better, you may need more tests or treatment. Even though your doctor has released you, you still need to watch for any problems. The doctor carefully checked you, but sometimes problems can develop later. If you have new symptoms or if your symptoms do not get better, get medical care right away. If you have worse or different chest pain or pressure that lasts more than 5 minutes or you passed out (lost consciousness), call 911 or seek other emergency help right away. A medical visit is only one step in your treatment. Even if you feel better, you still need to do what your doctor recommends, such as going to all suggested follow-up appointments and taking medicines exactly as directed.  This will help you recover and help prevent future problems. How can you care for yourself at home? · Rest until you feel better. · Take your medicine exactly as prescribed. Call your doctor if you think you are having a problem with your medicine. · Do not drive after taking a prescription pain medicine. When should you call for help? Call 911 if:  · You passed out (lost consciousness). · You have severe difficulty breathing. · You have symptoms of a heart attack. These may include:  ¨ Chest pain or pressure, or a strange feeling in your chest.  ¨ Sweating. ¨ Shortness of breath. ¨ Nausea or vomiting. ¨ Pain, pressure, or a strange feeling in your back, neck, jaw, or upper belly or in one or both shoulders or arms. ¨ Lightheadedness or sudden weakness. ¨ A fast or irregular heartbeat. After you call 911, the  may tell you to chew 1 adult-strength or 2 to 4 low-dose aspirin. Wait for an ambulance. Do not try to drive yourself. Call your doctor today if:  · You have any trouble breathing. · Your chest pain gets worse. · You are dizzy or lightheaded, or you feel like you may faint. · You are not getting better as expected. · You are having new or different chest pain. Where can you learn more? Go to http://saul-mil.info/. Enter A120 in the search box to learn more about \"Chest Pain: Care Instructions. \"  Current as of: May 27, 2016  Content Version: 11.1  © 3889-4591 [employer]. Care instructions adapted under license by [employer] (which disclaims liability or warranty for this information). If you have questions about a medical condition or this instruction, always ask your healthcare professional. [address] any warranty or liability for your use of this information.

## 2017-01-22 NOTE — PROGRESS NOTES
Unable to make 7 day follow up appointment per [facility] due to office being closed on Sunday. Pt instructed to call office Monday and schedule an appointment within 7 days. Pt verbalizes understanding to make follow up appointment.

## 2017-01-22 NOTE — PROGRESS NOTES
TRANSFER - IN REPORT:    Verbal report received from Angela Rondon RN (name) on Tommy Abbasi  being received from ED (unit) for routine progression of care      Report consisted of patients Situation, Background, Assessment and   Recommendations(SBAR). Information from the following report(s) Kardex, ED Summary and Recent Results was reviewed with the receiving nurse. Opportunity for questions and clarification was provided. Assessment completed upon patients arrival to unit and care assumed. Telemetry monitor applied. VS taken. Pt c/o SOB. Oxygen applied via 2L nasal cannula. Pt resting in bed with family at bedside. Bed low and locked. Side rails x 2. Call light within reach. Pt verbalizes understanding to call for assistance.

## 2017-01-22 NOTE — H&P
Pt sen and examined. A note is dictated. Imps:  Acute fall due to vertigo with posterior chest contusion  Chronic vertigo  Recurrent falls  Severe htn  Inc troponin due to supply/demand mismatch  Cad, hx of prior pci  Dm  Plan:  Observe overnight. Home in AM if all ok.

## 2017-01-22 NOTE — PROGRESS NOTES
Skin assessment completed with secondary RN. Skin intact with no breakdown noted. Sacrum and heels visualized with no breakdown noted. Right hand, third digit ecchymotic.

## 2017-07-21 NOTE — ED TRIAGE NOTES
Patient states her BP is elevated, Her physical therapist checked BP today. Patient also has had a headache.

## 2017-07-22 NOTE — ED PROVIDER NOTES
Patient is a 80 y.o. female presenting with hypertension. The history is provided by the patient and a relative. Hypertension    This is a new problem. The problem has not changed since onset. Associated symptoms include headaches and nausea. Pertinent negatives include no chest pain, no palpitations, no confusion, no malaise/fatigue, no dizziness and no shortness of breath. There are no associated agents to hypertension. Risk factors include hypertension. Past Medical History:   Diagnosis Date    ASCAD - (unspecified) 6/28/2016    Chest pain     unspecified    Diabetes mellitus ([facility] [address]     HTN - controlled, benign 6/28/2016    Lipid - Hyperlipidemia other unsp Dyslipidemia 6/28/2016    Neurological disorder     Observation for suspected cardiovascular disease     Respiratory insufficiency     distress       Past Surgical History:   Procedure Laterality Date    HX CHOLECYSTECTOMY      HX CORONARY STENT PLACEMENT      HX HEART CATHETERIZATION  3/31/08    HX HYSTERECTOMY      HX ORTHOPAEDIC Left     heel spur         Family History:   Problem Relation Age of Onset    Colon Cancer Other     Stomach Cancer Other     Cancer Other      lung    Cancer Sister      Colon    Cancer Brother      stomach    Cancer Brother      Colon    Heart Disease Sister        Social History     Social History    Marital status:      Spouse name: N/A    Number of children: N/A    Years of education: N/A     Occupational History    Not on file. Social History Main Topics    Smoking status: Never Smoker    Smokeless tobacco: Never Used    Alcohol use No    Drug use: Yes     Special: Prescription    Sexual activity: Not on file     Other Topics Concern    Not on file     Social History Narrative    Previously employed as a .           Exposure History:         Birds:  no    Asbestos: no      TB: no    Hot Tubs/Humidifier: no                 ALLERGIES: Albuterol; Ciprofloxacin; Codeine; and Demerol [meperidine]    Review of Systems   Constitutional: Negative. Negative for chills, fever and malaise/fatigue. HENT: Negative for congestion, ear pain, postnasal drip and rhinorrhea. Eyes: Negative for pain and visual disturbance. Respiratory: Negative for cough, shortness of breath and wheezing. Cardiovascular: Negative for chest pain, palpitations and leg swelling. Gastrointestinal: Positive for nausea. Negative for abdominal distention and abdominal pain. Endocrine: Negative. Negative for polydipsia, polyphagia and polyuria. Genitourinary: Negative. Negative for difficulty urinating, flank pain and frequency. Musculoskeletal: Negative. Negative for arthralgias and myalgias. Skin: Negative. Neurological: Positive for headaches. Negative for dizziness. Hematological: Negative. Psychiatric/Behavioral: Negative for confusion. Vitals:    07/21/17 1719 07/21/17 2033   BP: (!) 196/96 (!) 209/102   Pulse: 69 63   Resp: 18    Temp: 97.8 °F (36.6 °C)    SpO2: 99% 93%   Weight: 66.2 kg (146 lb)    Height: 5' (1.524 m)             Physical Exam   Constitutional: She is oriented to person, place, and time. She appears well-developed and well-nourished. Non-toxic appearance. She does not have a sickly appearance. She does not appear ill. No distress. HENT:   Head: Normocephalic and atraumatic. Cardiovascular: Intact distal pulses. Pulmonary/Chest: Effort normal.   Abdominal: Soft. Neurological: She is alert and oriented to person, place, and time. Skin: Skin is warm and dry. Psychiatric: She has a normal mood and affect. Her behavior is normal.   Nursing note and vitals reviewed. MDM  Number of Diagnoses or Management Options  Secondary hypertension: new and requires workup  Diagnosis management comments: Patient states that over the last few weeks she had had some minor falls but was not evaluated for these.   In the interval she has had some intermittent headaches, nausea and vomiting. She states that she has had headaches most of her life and that is not unusual.  She states that she came in today because she was having hypertension but it was unexplained as she is compliant with her medications and wasn't sure why she had such poor control over it. She states she does have a urinary tract infection and is on day 2 of antibiotic therapy. Denies fever or chills. Patient states that she is tolerating by mouth without difficulty. She was primarily concerned that she did not get better control of her blood pressure that she would have a stroke or other complication. While in the emergency room, during routine reevaluation's, patient's blood pressure again was noted to be significantly elevated. IV medication was given to address this however patient remained relatively asymptomatic other than her mild headache. If this failed to control blood pressure reasonably, plan would be to discuss admission for hospitalization and serial drugs were needed acutely. Apparently he has had difficulty with her blood pressure and is on 3 separate medicines already. She states she was late taking her new medicine (metoprolol (earlier this evening. She states that she is otherwise asymptomatic. She states that her headache is not new or unusual for her that has been on and off both left and right-sided for quite some time although today she thought was a little bit worse than previous episodes. After treatment in the ER, she states her headache has improved. She is currently sitting upright eating some dinner. Discussed with patient that I would have her see her regular primary care this week to discuss her medicine see if they need to be changed or altered again since she's been on new ones for at least 4 weeks at this point.   She also understands that if her symptoms wo she has any change or condition of the week and she should return to the ER rather wait see her doctor on Monday. Given Apresoline in ER to address acute elevations. Gave two doses of 10mg to ensure could tolerate.        Amount and/or Complexity of Data Reviewed  Clinical lab tests: ordered and reviewed  Tests in the radiology section of CPT®: ordered and reviewed      ED Course       Procedures

## 2017-07-22 NOTE — ED NOTES
MD Ha advised of BP, stated he spoke with family and is ok with pt going home and taking her BP meds after another dose of 10 mg hydralazine.

## 2017-08-26 NOTE — ED TRIAGE NOTES
PT arrived to ED via EMS after having a fall while walking out of a restaurant. PT has a Hx of falls. PT takes Plavix. Pt states she hit her head. PT denies any LOC. PT denies any pain at this time.

## 2017-08-26 NOTE — DISCHARGE INSTRUCTIONS

## 2017-08-26 NOTE — ED PROVIDER NOTES
HPI Comments: Patient reports a history of balance problems and occasional falls. She states she was walking out of a restaurant this evening when she again lost her balance and fell onto the sidewalk striking her head on the sidewalk. She denies any loss of consciousness she complains of mild pain to the right side of her head where she struck it on the sidewalk. There is no bleeding noted she denies any other injuries. She states she does feel \"funny in the head\" since the fall. She is on Plavix for coronary artery disease. Patient is a 80 y.o. female presenting with fall. The history is provided by the patient. Fall   The accident occurred 1 to 2 hours ago. The fall occurred while walking. She fell from a height of ground level. She landed on concrete. There was no blood loss. The point of impact was the head. The pain is present in the head. The pain is mild. She was ambulatory at the scene. There was no entrapment after the fall. There was no drug use involved in the accident. There was no alcohol use involved in the accident. Pertinent negatives include no visual change, no numbness, no abdominal pain, no nausea, no vomiting, no headaches, no extremity weakness, no hearing loss, no loss of consciousness, no tingling and no laceration. The risk factors include being elderly and recurrent falls (Antiplatelet medication). Exacerbated by: nothing. She has tried nothing for the symptoms. The treatment provided no relief.         Past Medical History:   Diagnosis Date    ASCAD - (unspecified) 6/28/2016    Chest pain     unspecified    Diabetes mellitus ([facility] [address]     HTN - controlled, benign 6/28/2016    Lipid - Hyperlipidemia other unsp Dyslipidemia 6/28/2016    Neurological disorder     Observation for suspected cardiovascular disease     Respiratory insufficiency     distress       Past Surgical History:   Procedure Laterality Date    HX CHOLECYSTECTOMY      HX CORONARY STENT PLACEMENT      HX HEART CATHETERIZATION  3/31/08    HX HYSTERECTOMY      HX ORTHOPAEDIC Left     heel spur         Family History:   Problem Relation Age of Onset    Colon Cancer Other     Stomach Cancer Other     Cancer Other      lung    Cancer Sister      Colon    Cancer Brother      stomach    Cancer Brother      Colon    Heart Disease Sister        Social History     Social History    Marital status:      Spouse name: N/A    Number of children: N/A    Years of education: N/A     Occupational History    Not on file. Social History Main Topics    Smoking status: Never Smoker    Smokeless tobacco: Never Used    Alcohol use No    Drug use: Yes     Special: Prescription    Sexual activity: Not on file     Other Topics Concern    Not on file     Social History Narrative    Previously employed as a . Exposure History:         Birds:  no    Asbestos: no      TB: no    Hot Tubs/Humidifier: no                 ALLERGIES: Albuterol; Ciprofloxacin; Codeine; and Demerol [meperidine]    Review of Systems   Gastrointestinal: Negative for abdominal pain, nausea and vomiting. Musculoskeletal: Negative for extremity weakness. Neurological: Negative for tingling, loss of consciousness, numbness and headaches. All other systems reviewed and are negative. Vitals:    08/25/17 2053   BP: (!) 210/94   Pulse: (!) 56   Resp: 18   Temp: 97.9 °F (36.6 °C)   SpO2: 96%   Weight: 59 kg (130 lb)   Height: 5' (1.524 m)            Physical Exam   Constitutional: She is oriented to person, place, and time. She appears well-developed and well-nourished. No distress. HENT:   Head: Normocephalic and atraumatic. Eyes: Conjunctivae and EOM are normal. Pupils are equal, round, and reactive to light. Neck: Normal range of motion. Neck supple. Cardiovascular: Normal rate, regular rhythm and normal heart sounds. Pulmonary/Chest: Effort normal and breath sounds normal.   Abdominal: Soft.  Bowel sounds are normal.   Musculoskeletal: Normal range of motion. She exhibits no edema, tenderness or deformity. Neurological: She is alert and oriented to person, place, and time. Skin: Skin is warm and dry. No laceration noted. Psychiatric: She has a normal mood and affect. Her behavior is normal.   Nursing note and vitals reviewed.        MDM  Number of Diagnoses or Management Options     Amount and/or Complexity of Data Reviewed  Tests in the radiology section of CPT®: ordered and reviewed    Risk of Complications, Morbidity, and/or Mortality  Presenting problems: low  Diagnostic procedures: low  Management options: low    Patient Progress  Patient progress: stable    ED Course       Procedures

## 2017-10-02 NOTE — ED TRIAGE NOTES
Patient presents with "[employer]. Called out by family, where patient was found lying supine on the floor. Patient noted to be alert but slightly confused. Family states worsening mental status over several weeks, as well as several falls. Patient complains of lower back pain, family states this is chronic. . 22LAC.

## 2017-10-02 NOTE — IP AVS SNAPSHOT
Rufina Gipson 
 
 
 [address] 
[phone] Patient: Karla Brown MRN: [id number] You are allergic to the following Allergen Reactions Albuterol Other (comments) tremors Ciprofloxacin Swelling Codeine Unknown (comments) Demerol (Meperidine) Nausea and Vomiting Immunizations Administered for This Admission Name Date Influenza Vaccine (Quad) PF 10/4/2017 Recent Documentation Height Weight BMI OB Status Smoking Status 1.524 m 55.8 kg 24.02 kg/m2 Postmenopausal Never Smoker Emergency Contacts Name Discharge Info Relation Home Work Mobile Michele Mera  Child [2] [phone] About your hospitalization You were admitted on:  October 3, 2017 You last received care in the:  [facility] 8 MED SURG You were discharged on:  2017 Unit phone number:  [phone] Why you were hospitalized Your primary diagnosis was:  Delirium Your diagnoses also included:  Fall, Htn (Hypertension), Malignant, Dnr (Do Not Resuscitate), Dementia Without Behavioral Disturbance Providers Seen During Your Hospitalizations Provider Role Specialty Primary office phone Romelia Bah MD Attending Provider Emergency Medicine [phone] Shandra Cosby MD Attending Provider Internal Medicine [phone] Your Primary Care Physician (PCP) Primary Care Physician Office Phone Office Fax Laura Broussard [phone] Follow-up Information Follow up With Details Comments Contact Info Marybeth Mendez MD On 10/9/2017 2:30pm [address] [address] 
[phone] Your Appointments 2017  2:30 PM EDT HOSPITAL FOLLOW-UP with Marybeth Mendez MD  
[address] [place] [address] [address] [place] ([facility] [address] [place]) [id number] [place] [address] [address] [facility]  
[phone] Wednesday November 08, 2017  1:30 PM EST Office Visit with India Arechiga MD  
[address] [place] [address] ([address] [place] [address]) [address] [facility] [place] [address] [facility]  
[phone] Current Discharge Medication List  
  
CONTINUE these medications which have NOT CHANGED Dose & Instructions Dispensing Information Comments Morning Noon Evening Bedtime ADVAIR DISKUS 250-50 mcg/dose diskus inhaler Generic drug:  fluticasone-salmeterol Your next dose is: This evening Dose:  1 Puff Take 1 Puff by inhalation two (2) times a day. Refills:  0  
     
  
   
   
  
   
  
 allopurinol 100 mg tablet Commonly known as:  Dorita Juan Your next dose is:  Tomorrow Morning Dose:  100 mg  
100 mg daily. Refills:  0  
     
  
   
   
   
  
 ascorbic acid (vitamin C) 500 mg tablet Commonly known as:  VITAMIN C Your next dose is: This evening Dose:  500 mg Take 1 Tab by mouth two (2) times a day. Quantity:  180 Tab Refills:  3  
     
  
   
   
  
   
  
 aspirin delayed-release 81 mg tablet Your next dose is:  Tomorrow Morning Dose:  81 mg Take 81 mg by mouth daily. Refills:  0  
     
  
   
   
   
  
 clopidogrel 75 mg Tab Commonly known as:  PLAVIX Your next dose is:  Tomorrow Morning Dose:  75 mg  
75 mg daily. Refills:  0  
     
  
   
   
   
  
 ferrous sulfate 325 mg (65 mg iron) tablet Your next dose is:  Tomorrow Morning Dose:  325 mg Take 1 Tab by mouth Daily (before breakfast). Quantity:  90 Tab Refills:  3  
     
  
   
   
   
  
 folic acid 1 mg tablet Commonly known as:  Google Your next dose is:  Tomorrow Morning Dose:  1 mg Take 1 mg by mouth daily. Refills:  0  
     
  
   
   
   
  
 levothyroxine 112 mcg tablet Commonly known as:  SYNTHROID Your next dose is:  Tomorrow Morning Dose:  112 mcg Take 1 Tab by mouth daily. Indications: hypothyroidism Quantity:  90 Tab Refills:  3  
     
  
   
   
   
  
 losartan 100 mg tablet Commonly known as:  COZAAR Your next dose is:  Tomorrow Morning Dose:  100 mg Take 1 Tab by mouth daily. Quantity:  90 Tab Refills:  3  
     
   
   
   
  
 meclizine 25 mg tablet Commonly known as:  ANTIVERT Your next dose is: Take on as needed schedule Dose:  25 mg Take 1 Tab by mouth three (3) times daily as needed. Indications: VERTIGO Quantity:  90 Tab Refills:  3  
     
   
   
   
  
 metFORMIN 500 mg tablet Commonly known as:  GLUCOPHAGE Dose:  500 mg Take 500 mg by mouth two (2) times a day. Refills:  0  
     
  
   
   
  
   
  
 metoprolol tartrate 50 mg tablet Commonly known as:  LOPRESSOR Your next dose is: This evening Dose:  25 mg Take 0.5 Tabs by mouth two (2) times a day. Quantity:  180 Tab Refills:  3  
     
   
   
   
  
 nitroglycerin 0.4 mg SL tablet Commonly known as:  NITROSTAT  
   
 by SubLINGual route every five (5) minutes as needed for Chest Pain. Refills:  0  
     
   
   
   
  
 pantoprazole 40 mg tablet Commonly known as:  PROTONIX Your next dose is:  Tomorrow Morning Dose:  40 mg Take 1 Tab by mouth daily. Indications: gastroesophageal reflux disease Quantity:  90 Tab Refills:  3  
     
  
   
   
   
  
 potassium chloride 20 mEq tablet Commonly known as:  K-DUR, KLOR-CON Your next dose is: This evening Dose:  20 mEq Take 1 Tab by mouth two (2) times a day. Quantity:  60 Tab Refills:  3  
     
  
   
   
  
   
  
 pravastatin 20 mg tablet Commonly known as:  PRAVACHOL Your next dose is: Take tonight Dose:  40 mg Take 2 Tabs by mouth nightly. Quantity:  90 Tab Refills:  3  
     
   
   
   
  
  
 promethazine 25 mg tablet Commonly known as:  PHENERGAN  Dose:  25 mg  
 Take 1 Tab by mouth every six (6) hours as needed for Nausea. Quantity:  24 Tab Refills:  3  
     
   
   
   
  
 sertraline 100 mg tablet Commonly known as:  ZOLOFT Your next dose is:  Tomorrow Morning Dose:  100 mg Take 100 mg by mouth daily. Refills:  0  
     
  
   
   
   
  
 triamterene-hydroCHLOROthiazide 37.5-25 mg per capsule Commonly known as:  Mace Barks Your next dose is:  Tomorrow Morning Dose:  1 Cap Take 1 Cap by mouth daily. Quantity:  180 Cap Refills:  3 VITAMIN B-12 1,000 mcg tablet Generic drug:  cyanocobalamin Your next dose is:  Tomorrow Morning Dose:  1000 mcg Take 1,000 mcg by mouth daily. Refills:  0 Discharge Instructions Learning About High Blood Pressure What is high blood pressure? Blood pressure is a measure of how hard the blood pushes against the walls of your arteries. It's normal for blood pressure to go up and down throughout the day, but if it stays up, you have high blood pressure. Another name for high blood pressure is hypertension. Two numbers tell you your blood pressure. The first number is the systolic pressure. It shows how hard the blood pushes when your heart is pumping. The second number is the diastolic pressure. It shows how hard the blood pushes between heartbeats, when your heart is relaxed and filling with blood. A blood pressure of less than 120/80 (say \"120 over 80\") is ideal for an adult. High blood pressure is 140/90 or higher. You have high blood pressure if your top number is 140 or higher or your bottom number is 90 or higher, or both. Many people fall into the category in between, called prehypertension. People with prehypertension need to make lifestyle changes to bring their blood pressure down and help prevent or delay high blood pressure. What happens when you have high blood pressure? · Blood flows through your arteries with too much force. Over time, this damages the walls of your arteries. But you can't feel it. High blood pressure usually doesn't cause symptoms. · Fat and calcium start to build up in your arteries. This buildup is called plaque. Plaque makes your arteries narrower and stiffer. Blood can't flow through them as easily. · This lack of good blood flow starts to damage some of the organs in your body. This can lead to problems such as coronary artery disease and heart attack, heart failure, stroke, kidney failure, and eye damage. How can you prevent high blood pressure? · Stay at a healthy weight. · Try to limit how much sodium you eat to less than 2,300 milligrams (mg) a day. If you limit your sodium to 1,500 mg a day, you can lower your blood pressure even more. ¨ Buy foods that are labeled \"unsalted,\" \"sodium-free,\" or \"low-sodium. \" Foods labeled \"reduced-sodium\" and \"light sodium\" may still have too much sodium. ¨ Flavor your food with garlic, lemon juice, onion, vinegar, herbs, and spices instead of salt. Do not use soy sauce, steak sauce, onion salt, garlic salt, mustard, or ketchup on your food. ¨ Use less salt (or none) when recipes call for it. You can often use half the salt a recipe calls for without losing flavor. · Be physically active. Get at least 30 minutes of exercise on most days of the week. Walking is a good choice. You also may want to do other activities, such as running, swimming, cycling, or playing tennis or team sports. · Limit alcohol to 2 drinks a day for men and 1 drink a day for women. · Eat plenty of fruits, vegetables, and low-fat dairy products. Eat less saturated and total fats. How is high blood pressure treated? · Your doctor will suggest making lifestyle changes. For example, your doctor may ask you to eat healthy foods, quit smoking, lose extra weight, and be more active. · If lifestyle changes don't help enough or your blood pressure is very high, you will have to take medicine every day. Follow-up care is a key part of your treatment and safety. Be sure to make and go to all appointments, and call your doctor if you are having problems. It's also a good idea to know your test results and keep a list of the medicines you take. Where can you learn more? Go to http://saul-mil.info/. Enter P501 in the search box to learn more about \"Learning About High Blood Pressure. \" Current as of: March 23, 2016 Content Version: 11.3 © 1192-8161 [employer] Education. Care instructions adapted under license by [employer] (which disclaims liability or warranty for this information). If you have questions about a medical condition or this instruction, always ask your healthcare professional. [place] [address] any warranty or liability for your use of this information. DISCHARGE SUMMARY from Nurse The following personal items are in your possession at time of discharge: 
 
Dental Appliances: None Visual Aid: Glasses, With patient Home Medications: None Jewelry: None Clothing: None Other Valuables: None PATIENT INSTRUCTIONS: 
 
 
F-face looks uneven A-arms unable to move or move unevenly S-speech slurred or non-existent T-time-call 911 as soon as signs and symptoms begin-DO NOT go Back to bed or wait to see if you get better-TIME IS BRAIN. Warning Signs of HEART ATTACK Call 911 if you have these symptoms: 
? Chest discomfort. Most heart attacks involve discomfort in the center of the chest that lasts more than a few minutes, or that goes away and comes back. It can feel like uncomfortable pressure, squeezing, fullness, or pain. ? Discomfort in other areas of the upper body. Symptoms can include pain or discomfort in one or both arms, the back, neck, jaw, or stomach. ? Shortness of breath with or without chest discomfort. ? Other signs may include breaking out in a cold sweat, nausea, or lightheadedness. Don't wait more than five minutes to call [address]! Fast action can save your life. Calling 911 is almost always the fastest way to get lifesaving treatment. Emergency Medical Services staff can begin treatment when they arrive  up to an hour sooner than if someone gets to the hospital by car. The discharge information has been reviewed with the patient. The patient verbalized understanding. Discharge medications reviewed with the patient and appropriate educational materials and side effects teaching were provided. Discharge Orders None ACO Transitions of Care Introducing [employer] [address] offers a voluntary care coordination program to provide high quality service and care to [facility] fee-for-service beneficiaries. Yessy Avalos was designed to help you enhance your health and well-being through the following services: ? Transitions of Care  support for individuals who are transitioning from one care setting to another (example: Hospital to home). ? Chronic and Complex Care Coordination  support for individuals and caregivers of those with serious or chronic illnesses or with more than one chronic (ongoing) condition and those who take a number of different medications. If you meet specific medical criteria, a [facility] [address] may call you directly to coordinate your care with your primary care physician and your other care providers. For questions about the [facility] programs, please, contact your physicians office.  
 
For general questions or additional information about Accountable Care Organizations: 
Please visit www.medicare.gov/acos. html or call 1-800-MEDICARE (5-296.570.7428) TTY users should call 8-942.312.3597. Meniga Announcement We are excited to announce that we are making your provider's discharge notes available to you in Meniga. You will see these notes when they are completed and signed by the physician that discharged you from your recent hospital stay. If you have any questions or concerns about any information you see in Meniga, please call the Health Information Department where you were seen or reach out to your Primary Care Provider for more information about your plan of care. Introducing [facility] HE[facility]! Scout Contreras introduces Meniga patient portal. Now you can access parts of your medical record, email your doctor's office, and request medication refills online. 1. In your internet browser, go to [email or web address] 2. Click on the First Time User? Click Here link in the Sign In box. You will see the New Member Sign Up page. 3. Enter your Meniga Access Code exactly as it appears below. You will not need to use this code after youve completed the sign-up process. If you do not sign up before the expiration date, you must request a new code. · Meniga Access Code: [email or web address] Expires: 12/24/2017  3:47 PM 
 
4. Enter the last four digits of your Social Security Number (xxxx) and Date of Birth (mm/dd/yyyy) as indicated and click Submit. You will be taken to the next sign-up page. 5. Create a [employer] ID. This will be your Meniga login ID and cannot be changed, so think of one that is secure and easy to remember. 6. Create a Meniga password. You can change your password at any time. 7. Enter your Password Reset Question and Answer. This can be used at a later time if you forget your password. 8. Enter your e-mail address. You will receive e-mail notification when new information is available in [address] 9. Click Sign Up. You can now view and download portions of your medical record. 10. Click the Download Summary menu link to download a portable copy of your medical information. If you have questions, please visit the Frequently Asked Questions section of the [employer] website. Remember, [employer] is NOT to be used for urgent needs. For medical emergencies, dial 911. Now available from your iPhone and Android! General Information Please provide this summary of care documentation to your next provider. Patient Signature:  ____________________________________________________________ Date:  ____________________________________________________________  
  
Frieda Mcnair Provider Signature:  ____________________________________________________________ Date:  ____________________________________________________________
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
## 2017-10-03 PROBLEM — Z66 DNR (DO NOT RESUSCITATE): Chronic | Status: ACTIVE | Noted: 2017-01-01

## 2017-10-03 PROBLEM — R41.82 ALTERED MENTAL STATE: Status: ACTIVE | Noted: 2017-01-01

## 2017-10-03 PROBLEM — I10 HTN (HYPERTENSION), MALIGNANT: Status: ACTIVE | Noted: 2017-01-01

## 2017-10-03 PROBLEM — R41.0 DELIRIUM: Status: ACTIVE | Noted: 2017-01-01

## 2017-10-03 PROBLEM — I10 HTN (HYPERTENSION), MALIGNANT: Chronic | Status: ACTIVE | Noted: 2017-01-01

## 2017-10-03 NOTE — PROGRESS NOTES
Order received and chart reviewed. PT assessment attempted but nursing reports patient has been medicated for agitation and is not able to participate in therapy at this time. Will check back later this afternoon to see if she is able to participate.     YANG RennerT

## 2017-10-03 NOTE — PROGRESS NOTES
Patient received via stretcher as an admission from ER to room 825. Patient transferred with assist x2 to bed. Patient alert and confused, oriented to self only. Orientation to room/unit given. Admisison database/assessment completed. Skin assessment completed with SHALINI Chiang LPN. Skin warm/dry, no areas of skin breakdown noted. Patient incontinent of urine. Pericare given, linens changed. Patient agitated/restless, attempts to get out of bed numerous times despite having lap belt in place, states she is looking for her babies. Patient attempting to pull out iv site. Hospitalist made aware of patient's behavior. Patient medicated with haldol 1mg iv and bilateral soft wrist restraints placed on patient as per MD orders. Bed in low position, call bell and posey pad in place for safety. Will monitor.

## 2017-10-03 NOTE — PROGRESS NOTES
MRI checklist completed with daughter. States she will be visiting mother tonight and will sign consent form.  MRI is scheduled for tomorrow am.

## 2017-10-03 NOTE — ED NOTES
TRANSFER - OUT REPORT:    Verbal report given to Tim Barrow RN on Eliotd Fix  being transferred to [facility] for routine progression of care       Report consisted of patients Situation, Background, Assessment and   Recommendations(SBAR). Information from the following report(s) SBAR, ED Summary, [facility] and Recent Results was reviewed with the receiving nurse. Lines:   Peripheral IV 07/21/17 Left Antecubital (Active)       Peripheral IV 10/02/17 Left Antecubital (Active)   Site Assessment Clean, dry, & intact 10/2/2017  7:44 PM   Phlebitis Assessment 0 10/2/2017  7:44 PM   Infiltration Assessment 0 10/2/2017  7:44 PM   Dressing Status Clean, dry, & intact 10/2/2017  7:44 PM   Hub Color/Line Status Blue 10/2/2017  7:44 PM        Opportunity for questions and clarification was provided.       Patient transported with:   [employer]

## 2017-10-03 NOTE — ED NOTES
This RN entered room to tell family members that patient had a room and would be going upstairs shortly. Family was not in room and patient was found on floor. Patient assisted to knees, and then back to bed with staff assistance. Patient is not expressing any pain, moving all extremities. IV had been ripped out, new IV placed. Hospitalist aware.  Patient repeat vitals HR 85 /93 SpO2 94% RA Temp 98.0

## 2017-10-03 NOTE — PHYSICIAN ADVISORY
Letter of Determination:  Outpatient states receiving Observation Services    This case was reviewed, and I concur with Outpatient status receiving observation services. This determination may change depending on further medical information, condition changes of the patient, or treatment requirements.       Isela Sandoval MD, SOHAN,   Physician Jerardo Figueroa.

## 2017-10-03 NOTE — PROGRESS NOTES
Assessment completed. Pt is confused and in soft wrist restraints bilaterally for fall precaution. Posey alarm activated. Easily aroused, disoriented x 4. Nurse is aware of scheduled MRI and will attempt to obtain consent from family. On RA. Running NSR on remote tele. No s/s of distress. Bed locked, in low position, call light in reach. Will monitor.

## 2017-10-03 NOTE — PROGRESS NOTES
Patient remains restless/agitated, continues to attempt to get out of bed. Wrist restraints remain in place for safety. Will monitor.

## 2017-10-03 NOTE — H&P
Hospitalist H&P Note     Admit Date:  10/2/2017  7:19 PM   Name:  Tommy Abbasi   Age:  80 y.o.  :  1929   MRN:  [id number]   PCP:  Shirley Mckeon MD  Treatment Team: Attending Provider: Brent Aguilar MD; Primary Nurse: Gildardo Medeiros RN    HPI:   History is from the daughter . 80yr year old  female pt,who lives alone,walks with a walker,known hx of cad,dm type 2,htn,hx of tia  Was brought to the er with the c/o being found on the floor  and confused. According to daughter,pt does have h/o vertigo and falls. Since past 1 week has been talking about and seeing people who are . In er pt couldn't say were she was,after giving several clues felt that she might in a doctors office. Pt also was saying when can she clock out- felt she was at work. Pt does have good remote memory ,could recognise her daughter,son in law in the room,could say her home address appropriately. Says doesn't remember how she fell on the floor. Didn't c/o dizziness but says some times would get dizzy while getting up from a sitting position. Labs ,ekg,urine ,cxr,ct head were normal.  No focal neurological deficits on examation except for the above mentioned problems. Did c/o nausea for which she was given meds in er. 10 systems reviewed and negative except as noted in HPI.   Past Medical History:   Diagnosis Date    ASCAD - (unspecified) 2016    Chest pain     unspecified    Diabetes mellitus ([facility] [address]     HTN - controlled, benign 2016    Lipid - Hyperlipidemia other unsp Dyslipidemia 2016    Neurological disorder     Observation for suspected cardiovascular disease     Respiratory insufficiency     distress      Past Surgical History:   Procedure Laterality Date    HX CHOLECYSTECTOMY      HX CORONARY STENT PLACEMENT      HX HEART CATHETERIZATION  3/31/08    HX HYSTERECTOMY      HX ORTHOPAEDIC Left     heel spur      Allergies   Allergen Reactions    Albuterol Other (comments)     tremors    Ciprofloxacin Swelling    Codeine Unknown (comments)    Demerol [Meperidine] Nausea and Vomiting      Social History   Substance Use Topics    Smoking status: Never Smoker    Smokeless tobacco: Never Used    Alcohol use No      Family History   Problem Relation Age of Onset    Colon Cancer Other     Stomach Cancer Other     Cancer Other      lung    Cancer Sister      Colon    Cancer Brother      stomach    Cancer Brother      Colon    Heart Disease Sister       Immunization History   Administered Date(s) Administered    Influenza High Dose Vaccine PF 2016    Pneumococcal Conjugate (PCV-13) 2015    Pneumococcal Polysaccharide (PPSV-23) 10/12/2005    Zoster Vaccine, Live 2017     PTA Medications:  Prior to Admission Medications   Prescriptions Last Dose Informant Patient Reported? Taking? ADVAIR DISKUS 250-50 mcg/dose diskus inhaler   Yes Yes   Sig: Take 1 Puff by inhalation two (2) times a day. allopurinol (ZYLOPRIM) 100 mg tablet   Yes Yes   Si mg daily. ascorbic acid, vitamin C, (VITAMIN C) 500 mg tablet   No Yes   Sig: Take 1 Tab by mouth two (2) times a day. aspirin delayed-release 81 mg tablet   Yes Yes   Sig: Take 81 mg by mouth daily. clopidogrel (PLAVIX) 75 mg tablet   Yes Yes   Si mg daily. cyanocobalamin (VITAMIN B-12) 1,000 mcg tablet   Yes Yes   Sig: Take 1,000 mcg by mouth daily. ferrous sulfate 325 mg (65 mg iron) tablet   No Yes   Sig: Take 1 Tab by mouth Daily (before breakfast). folic acid (FOLVITE) 1 mg tablet   Yes Yes   Sig: Take 1 mg by mouth daily. levothyroxine (SYNTHROID) 112 mcg tablet   No Yes   Sig: Take 1 Tab by mouth daily. Indications: hypothyroidism   losartan (COZAAR) 100 mg tablet   No Yes   Sig: Take 1 Tab by mouth daily. meclizine (ANTIVERT) 25 mg tablet   No Yes   Sig: Take 1 Tab by mouth three (3) times daily as needed.  Indications: VERTIGO   metFORMIN (GLUCOPHAGE) 500 mg tablet   Yes Yes   Sig: Take 500 mg by mouth two (2) times a day. metoprolol tartrate (LOPRESSOR) 50 mg tablet   No Yes   Sig: Take 0.5 Tabs by mouth two (2) times a day. nitroglycerin (NITROSTAT) 0.4 mg SL tablet   Yes Yes   Sig: by SubLINGual route every five (5) minutes as needed for Chest Pain.   pantoprazole (PROTONIX) 40 mg tablet   No Yes   Sig: Take 1 Tab by mouth daily. Indications: gastroesophageal reflux disease   potassium chloride (K-DUR, KLOR-CON) 20 mEq tablet   No Yes   Sig: Take 1 Tab by mouth two (2) times a day. pravastatin (PRAVACHOL) 20 mg tablet   No Yes   Sig: Take 2 Tabs by mouth nightly. promethazine (PHENERGAN) 25 mg tablet   No Yes   Sig: Take 1 Tab by mouth every six (6) hours as needed for Nausea. sertraline (ZOLOFT) 100 mg tablet   Yes Yes   Sig: Take 100 mg by mouth daily. triamterene-hydroCHLOROthiazide (DYAZIDE) 37.5-25 mg per capsule   No Yes   Sig: Take 1 Cap by mouth daily. Facility-Administered Medications: None       Objective:   Patient Vitals for the past 24 hrs:   Temp Pulse Resp BP SpO2   10/02/17 2359 - 69 18 164/83 95 %   10/02/17 2223 - 72 24 (!) 208/100 98 %   10/02/17 1923 98.3 °F (36.8 °C) 63 16 (!) 204/97 98 %     Oxygen Therapy  O2 Sat (%): 95 % (10/02/17 2359)  Pulse via Oximetry: 70 beats per minute (10/02/17 2359)  O2 Device: Room air (10/02/17 2359)  No intake or output data in the 24 hours ending 10/03/17 0052    Physical Exam:  General:    Well nourished. Alert. Eyes:   Normal sclera. Extraocular movements intact. ENT:  Normocephalic, atraumatic. Moist mucous membranes  CV:   RRR. No murmur, rub, or gallop. Lungs:  CTAB. No wheezing, rhonchi, or rales. Abdomen: Soft, nontender, nondistended. Bowel sounds normal.   Extremities: Warm and dry. No cyanosis or edema. Neurologic: CN II-XII grossly intact. Sensation intact. Not oriented in place and time. Couldn't say were she was. Skin:     No rashes or jaundice.     Psych:  Normal mood and affect. I reviewed the labs, imaging, EKGs, telemetry, and other studies done this admission. Data Review:   Recent Results (from the past 24 hour(s))   TROPONIN I    Collection Time: 10/02/17  5:50 PM   Result Value Ref Range    Troponin-I, Qt. <0.02 (L) 0.02 - 0.05 NG/ML   EKG, 12 LEAD, INITIAL    Collection Time: 10/02/17  7:35 PM   Result Value Ref Range    Ventricular Rate 68 BPM    Atrial Rate 68 BPM    P-R Interval 232 ms    QRS Duration 86 ms    Q-T Interval 382 ms    QTC Calculation (Bezet) 406 ms    Calculated P Axis 51 degrees    Calculated R Axis -37 degrees    Calculated T Axis 26 degrees    Diagnosis       !! AGE AND GENDER SPECIFIC ECG ANALYSIS !! Sinus rhythm with 1st degree A-V block  !!! Poor data quality, interpretation may be adversely affected  Voltage criteria for left ventricular hypertrophy Left anterior fascicular   block  old anterior lateral MI pattern   Abnormal ECG  When compared with ECG of 21-JUL-2017 17:21,  Present  T wave inversion now evident in Lateral leads  Confirmed by GAGAN FENG (), Jason Hammond ([id number]) on 10/2/2017 9:59:48 PM     CBC WITH AUTOMATED DIFF    Collection Time: 10/02/17  7:39 PM   Result Value Ref Range    WBC 10.3 4.3 - 11.1 K/uL    RBC 4.33 4.05 - 5.25 M/uL    HGB 11.7 11.7 - 15.4 g/dL    HCT 35.2 (L) 35.8 - 46.3 %    MCV 81.3 79.6 - 97.8 FL    MCH 27.0 26.1 - 32.9 PG    MCHC 33.2 31.4 - 35.0 g/dL    RDW 16.4 (H) 11.9 - 14.6 %    PLATELET 293 081 - 429 K/uL    MPV 10.9 10.8 - 14.1 FL    DF AUTOMATED      NEUTROPHILS 84 (H) 43 - 78 %    LYMPHOCYTES 11 (L) 13 - 44 %    MONOCYTES 5 4.0 - 12.0 %    EOSINOPHILS 0 (L) 0.5 - 7.8 %    BASOPHILS 0 0.0 - 2.0 %    IMMATURE GRANULOCYTES 0.2 0.0 - 5.0 %    ABS. NEUTROPHILS 8.6 (H) 1.7 - 8.2 K/UL    ABS. LYMPHOCYTES 1.1 0.5 - 4.6 K/UL    ABS. MONOCYTES 0.5 0.1 - 1.3 K/UL    ABS. EOSINOPHILS 0.0 0.0 - 0.8 K/UL    ABS. BASOPHILS 0.0 0.0 - 0.2 K/UL    ABS. IMM.  GRANS. 0.0 0.0 - 0.5 K/UL   METABOLIC PANEL, COMPREHENSIVE Collection Time: 10/02/17  7:39 PM   Result Value Ref Range    Sodium 139 136 - 145 mmol/L    Potassium 4.3 3.5 - 5.1 mmol/L    Chloride 107 98 - 107 mmol/L    CO2 23 21 - 32 mmol/L    Anion gap 9 7 - 16 mmol/L    Glucose 118 (H) 65 - 100 mg/dL    BUN 21 8 - 23 MG/DL    Creatinine 0.86 0.6 - 1.0 MG/DL    GFR est AA >60 >60 ml/min/1.73m2    GFR est non-AA >60 >60 ml/min/1.73m2    Calcium 9.7 8.3 - 10.4 MG/DL    Bilirubin, total 0.4 0.2 - 1.1 MG/DL    ALT (SGPT) 13 12 - 65 U/L    AST (SGOT) 35 15 - 37 U/L    Alk. phosphatase 73 50 - 136 U/L    Protein, total 7.5 6.3 - 8.2 g/dL    Albumin 3.7 3.2 - 4.6 g/dL    Globulin 3.8 (H) 2.3 - 3.5 g/dL    A-G Ratio 1.0 (L) 1.2 - 3.5     CK    Collection Time: 10/02/17  7:39 PM   Result Value Ref Range    CK 69 21 - 215 U/L       All Micro Results     None          Other Studies:  Xr Spine Lumb 2 Or 3 V    Result Date: 10/2/2017  EXAM:  XR SPINE LUMB 2 OR 3 V INDICATION:   found in floor with increasing pain beyond baseline COMPARISON: None. FINDINGS: AP, lateral and spot lateral views of the lumbar spine demonstrate levoscoliosis. There is a chronic superior endplate compression fracture of L3. The vertebrae are otherwise anatomically aligned. The posterior elements are intact. There is no fracture, subluxation or other abnormality. IMPRESSION:  Chronic superior endplate compression fracture of L3. Levoscoliosis. Ct Head Wo Cont    Result Date: 10/2/2017  CT HEAD WITHOUT CONTRAST HISTORY:  Transient alteration of awareness. COMPARISON: 8/25/2017 TECHNIQUE: Axial imaging was performed without intravenous contrast utilizing 5mm slice thickness. Sagittal and coronal reformats were performed. 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    -  No intracranial mass, hematoma, or hydrocephalus. -  For patient's age, the scattered areas of white matter hypodensities may represent a chronic small vessel white matter ischemia.   However this is nonspecific. *VISUALIZED PARANASAL SINUSES: Well aerated. *MASTOIDS:  Clear. *CALVARIUM AND SCALP: Unremarkable. IMPRESSION: No evidence of acute intracranial abnormalities. Date of Dictation: 10/2/2017 10:03 PM     Xr Chest Port    Result Date: 10/3/2017  EXAM:  XR CHEST PORT INDICATION:  Weakness and fatigue COMPARISON:  1/21/2017 FINDINGS: A portable AP radiograph of the chest was obtained at 0009 hours. The patient is on a cardiac monitor. COPD with clear lungs. The cardiac and mediastinal contours and pulmonary vascularity are normal.  The bones and soft tissues are grossly within normal limits. IMPRESSION: COPD. No acute cardiopulmonary disease. Assessment and Plan:     Hospital Problems as of 10/3/2017  Date Reviewed: 8/11/2016          Codes Class Noted - Resolved POA    Altered mental state ICD-10-CM: R41.82  ICD-9-CM: 780.97  10/3/2017 - Present Unknown        Fall ICD-10-CM: W19. Samira Clark  ICD-9-CM: E888.9  10/3/2017 - Present Unknown        HTN (hypertension), malignant ICD-10-CM: I10  ICD-9-CM: 401.0  10/3/2017 - Present Unknown            Cad  Dm type 2  Chronic vertigo  PLAN:    Bed rest  Fall precautions  Physical therapy consult  Prn hydralazine for systolic MV>643 mmhg  Will do cva work up for confusion. No source of infection at this point. With high risk of fall advised the family members that pt could no longer live alone. Will laso do social service consult.     DVT ppx:  heparin  Anticipated DC needs:    Code status: DNR  Estimated LOS:  less than 2 midnights  Risk:  high    Signed:  Reyna Norton MD

## 2017-10-03 NOTE — PROGRESS NOTES
10/3/2017  OT Note: OT order received and chart reviewed. Per nursing patient presents with altered mental status and agitation. Pt recently received haldol and nursing requests to hold for now. Will re-attempt as time permits.    Thank you,  Wan Mark, OT

## 2017-10-03 NOTE — ED PROVIDER NOTES
HPI Comments: The patient is brought in after being found in the floor where she had been for uncertain amount of time. She is brought in by her family who provides much of the history. she maintains independent living though probably functionally this is not where she should be. She supposed to use a walker with a walker was found quite a distance from where she was found on the floor. The daughter tried to contact her today and after being unsuccessful on multiple attempts went by. At was when she was found to the floor. Somewhat uncertain as mentioned above how long she had been there. Over the last several weeks she has had increasing frequency of falls. No focal motor sensory issues seem to have been identified during this time. No noted speech or vision change. She does have progressive confusion and has actually been talking to family members that are . She sees her son and  who have all  quite some time ago. She thinks that her grandsons are living under her bed though she cannot hear their voices. In our department she does have confusion and at times family nor myself for certain what she is trying to say. Patient is a 80 y.o. female presenting with back pain. The history is provided by the patient and a relative. Back Pain    This is a chronic problem.         Past Medical History:   Diagnosis Date    ASCAD - (unspecified) 2016    Chest pain     unspecified    Diabetes mellitus ([facility] [address]     HTN - controlled, benign 2016    Lipid - Hyperlipidemia other unsp Dyslipidemia 2016    Neurological disorder     Observation for suspected cardiovascular disease     Respiratory insufficiency     distress       Past Surgical History:   Procedure Laterality Date    HX CHOLECYSTECTOMY      HX CORONARY STENT PLACEMENT      HX HEART CATHETERIZATION  3/31/08    HX HYSTERECTOMY      HX ORTHOPAEDIC Left     heel spur         Family History:   Problem Relation Age of Onset    Colon Cancer Other     Stomach Cancer Other     Cancer Other      lung    Cancer Sister      Colon    Cancer Brother      stomach    Cancer Brother      Colon    Heart Disease Sister        Social History     Social History    Marital status:      Spouse name: N/A    Number of children: N/A    Years of education: N/A     Occupational History    Not on file. Social History Main Topics    Smoking status: Never Smoker    Smokeless tobacco: Never Used    Alcohol use No    Drug use: Yes     Special: Prescription    Sexual activity: Not on file     Other Topics Concern    Not on file     Social History Narrative    Previously employed as a . Exposure History:         Birds:  no    Asbestos: no      TB: no    Hot Tubs/Humidifier: no                 ALLERGIES: Albuterol; Ciprofloxacin; Codeine; and Demerol [meperidine]    Review of Systems   Musculoskeletal: Positive for back pain. Vitals:    10/02/17 1923   BP: (!) 204/97   Pulse: 63   Resp: 16   Temp: 98.3 °F (36.8 °C)   SpO2: 98%   Weight: 55.8 kg (123 lb)   Height: 5' (1.524 m)            Physical Exam   Constitutional: She appears well-developed and well-nourished. No distress. HENT:   Head: Atraumatic. Eyes: No scleral icterus. Neck: Neck supple. Cardiovascular: Normal rate and intact distal pulses. Pulmonary/Chest: Effort normal. No respiratory distress. She has no wheezes. Abdominal: Soft. There is no tenderness. There is no rebound. Neurological: She is alert. She has normal strength. No sensory deficit. Periods of confusion during her ER stay. Skin: Skin is warm and dry. Psychiatric: Her behavior is normal. Thought content normal.   Nursing note and vitals reviewed. MDM  Number of Diagnoses or Management Options  Diagnosis management comments: Unable to find the cause of her rapid transition of confusion over the last 2 weeks. No acute infectious process is identified. Metabolic studies are not yielding a cause as well. Head CT shows chronic changes. Family history is reports a Percocet and found confusion at times. Patient also has a fairly rapid worsening while she is in our department some of this may be related to presence of patient and an unfamiliar environment. Has some escalation of her blood pressure in our department it may very well be provoked by agitation. Amount and/or Complexity of Data Reviewed  Clinical lab tests: ordered and reviewed  Tests in the radiology section of CPT®: reviewed and ordered  Obtain history from someone other than the patient: yes  Independent visualization of images, tracings, or specimens: yes    Risk of Complications, Morbidity, and/or Mortality  Presenting problems: high  Diagnostic procedures: low  Management options: moderate      ED Course       Procedures      CT HEAD WITHOUT CONTRAST      HISTORY:  Transient alteration of awareness.     COMPARISON: 8/25/2017     TECHNIQUE: Axial imaging was performed without intravenous contrast utilizing  5mm slice thickness. Sagittal and coronal reformats were performed.     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     -  No intracranial mass, hematoma, or hydrocephalus. -  For patient's age, the scattered areas of white matter hypodensities may  represent a chronic small vessel white matter ischemia. However this is  nonspecific.     *VISUALIZED PARANASAL SINUSES: Well aerated. *MASTOIDS:  Clear.   *CALVARIUM AND SCALP: Unremarkable.        IMPRESSION  IMPRESSION:     No evidence of acute intracranial abnormalities.     Date of Dictation: 10/2/2017 10:03 PM
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
## 2017-10-04 PROBLEM — F03.90 DEMENTIA WITHOUT BEHAVIORAL DISTURBANCE (HCC): Chronic | Status: ACTIVE | Noted: 2017-01-01

## 2017-10-04 PROBLEM — R41.0 DELIRIUM: Status: RESOLVED | Noted: 2017-01-01 | Resolved: 2017-01-01

## 2017-10-04 NOTE — PROGRESS NOTES
Problem: Self Care Deficits Care Plan (Adult)  Goal: *Acute Goals and Plan of Care (Insert Text)  1. Patient will complete lower body bathing and dressing with modified independence and adaptive equipment as needed. 2. Patient will complete toileting with modified independence. 3. Patient will tolerate 25 minutes of OT treatment with minimal rest breaks to increase activity tolerance for ADLs. 4. Patient will complete functional transfers with modified independence and adaptive equipment as needed. Timeframe: 7 visits       OCCUPATIONAL THERAPY: Initial Assessment 10/4/2017  OBSERVATION: Hospital Day: 3  [place] / [place] / Product Type: Medicare /      NAME/AGE/GENDER: Karla Brown is a 80 y.o. female       PRIMARY DIAGNOSIS:  Altered mental state  Fall  HTN (hypertension), malignant Delirium Delirium        ICD-10: Treatment Diagnosis:        · Generalized Muscle Weakness (M62.81)  · Other lack of cordination (R27.8)  · Dizziness and Giddiness (R42)   Precautions/Allergies:         Albuterol; Ciprofloxacin; Codeine; and Demerol [meperidine]       ASSESSMENT:      Ms. Linette Knight presents supine in bed agreeable to therapy. Pt is mostly oriented, exhibits intermittent confusion and decreased safety awareness. Pt c/o dizziness with visual assessment and looking side to side during activities. She demonstrates a fixed gaze to the right at times requiring verbal and occasional tactile cueing to attend the current activity. Ms. Linette Knight presents with decreased activity tolerance, and decreased independence for self care, functional mobility, and ADL's. Requires skilled OT to maximize independence with self care tasks and functional transfers. Educated pt on safety awareness including using the microwave vs. Oven/stove, rest breaks, and using walker at all times; pt verbalized understanding, recommend continued education.  Pt left with PT and all needs in reach, pt instructed to call for assistance; RN aware. Ms. Carlos Quinn was discharged from our facility before further treatment could be provided in this setting. This section established at most recent assessment   PROBLEM LIST (Impairments causing functional limitations):  1. Decreased Strength  2. Decreased ADL/Functional Activities  3. Decreased Transfer Abilities  4. Decreased Ambulation Ability/Technique  5. Decreased Balance  6. Decreased Activity Tolerance  7. Decreased Work Simplification/Energy Conservation Techniques  8. Increased Fatigue  9. Decreased Knowledge of Precautions  10. Decreased Cognition    INTERVENTIONS PLANNED: (Benefits and precautions of occupational therapy have been discussed with the patient.)  1. Activities of daily living training  2. Adaptive equipment training  3. Balance training  4. Donning&doffing training  5. Group therapy  6. Therapeutic activity  7. Therapeutic exercise  8. Energy conservation      TREATMENT PLAN: Frequency/Duration: Follow patient 3x/week to address above goals. Rehabilitation Potential For Stated Goals: GOOD      RECOMMENDED REHABILITATION/EQUIPMENT: (at time of discharge pending progress): Due to the probability of continued deficits (see above) this patient will likely need continued skilled occupational therapy after discharge. Equipment:   · to be determined                      OCCUPATIONAL PROFILE AND HISTORY:   History of Present Injury/Illness (Reason for Referral):  See H&P  Past Medical History/Comorbidities:   Ms. Carlos Quinn  has a past medical history of ASCAD - (unspecified) (6/28/2016); Chest pain; Diabetes mellitus ([facility] [address] HTN - controlled, benign (6/28/2016); Lipid - Hyperlipidemia other unsp Dyslipidemia (6/28/2016); Neurological disorder; Observation for suspected cardiovascular disease; and Respiratory insufficiency.   Ms. Carlos Quinn  has a past surgical history that includes heart catheterization (3/31/08); coronary stent placement; cholecystectomy; hysterectomy; and orthopaedic (Left). Social History/Living Environment:   Home Environment: Private residence  # Steps to Enter: 3  Rails to Enter: Yes  Hand Rails : Left  One/Two Story Residence: One story  Living Alone: Yes  Support Systems: Child(carlos)  Patient Expects to be Discharged to[de-identified] Private residence  Current DME Used/Available at Home: Anjali Rios, Wilfredo Bowersanda, Anjali Rios, rollator, [id number] chair  Tub or Shower Type: Tub/Shower combination  Prior Level of Function/Work/Activity:  Lives alone with family near by. Hx of falls. Number of Personal Factors/Comorbidities that affect the Plan of Care: Expanded review of therapy/medical records (1-2):  MODERATE COMPLEXITY   ASSESSMENT OF OCCUPATIONAL PERFORMANCE[de-identified]   Activities of Daily Living:           Basic ADLs (From Assessment) Complex ADLs (From Assessment)   Basic ADL  Feeding: Modified independent  Oral Facial Hygiene/Grooming: Setup  Bathing: Stand-by assistance  Upper Body Dressing: Modified independent  Lower Body Dressing: Supervision  Toileting: Supervision Instrumental ADL  Meal Preparation: Moderate assistance  Homemaking: Moderate assistance  Medication Management: Supervision  Financial Management: Supervision   Grooming/Bathing/Dressing Activities of Daily Living     Cognitive Retraining  Safety/Judgement: Fall prevention;Decreased insight into deficits; Decreased awareness of need for safety;Decreased awareness of need for assistance                 Functional Transfers  Toilet Transfer : Contact guard assistance;Minimum assistance  Shower Transfer: Minimum assistance     Bed/Mat Mobility  Rolling: Stand-by asssistance  Supine to Sit: Stand-by asssistance  Sit to Stand: Contact guard assistance  Bed to Chair: Contact guard assistance;Minimum assistance  Scooting: Contact guard assistance          Most Recent Physical Functioning:   Gross Assessment:  AROM: Generally decreased, functional  PROM: Generally decreased, functional  Strength: Generally decreased, functional               Posture:  Posture (WDL): Exceptions to WDL  Posture Assessment: Rounded shoulders  Balance:  Sitting: Impaired  Sitting - Static: Prop sitting  Sitting - Dynamic: Prop sitting  Standing: Impaired  Standing - Static: Fair  Standing - Dynamic : Fair Bed Mobility:  Rolling: Stand-by asssistance  Supine to Sit: Stand-by asssistance  Scooting: Contact guard assistance  Wheelchair Mobility:     Transfers:  Sit to Stand: Contact guard assistance  Stand to Sit: Contact guard assistance  Bed to Chair: Contact guard assistance;Minimum assistance                    Patient Vitals for the past 6 hrs:       BP BP Patient Position SpO2 Pulse   10/04/17 0800 140/79 At rest 94 % 67        Mental Status  Neurologic State: Alert  Orientation Level: Oriented to person, Oriented to place, Oriented to time  Cognition: Decreased attention/concentration, Decreased command following  Perception: Appears intact  Perseveration: No perseveration noted  Safety/Judgement: Fall prevention, Decreased insight into deficits, Decreased awareness of need for safety, Decreased awareness of need for assistance                               Physical Skills Involved:  1. Range of Motion  2. Balance  3. Strength  4. Activity Tolerance  5. Gross Motor Control Cognitive Skills Affected (resulting in the inability to perform in a timely and safe manner):  1. Perception  2. Executive Function  3. Sustained Attention  4. Divided Attention  5. Comprehension Psychosocial Skills Affected:  1. Habits/Routines  2. Environmental Adaptation  3. Self-Awareness   Number of elements that affect the Plan of Care: 5+:  HIGH COMPLEXITY   CLINICAL DECISION MAKIN [facility] [address] [address] AM-PAC 6 Clicks   Daily Activity Inpatient Short Form  How much help from another person does the patient currently need. .. Total A Lot A Little None   1. Putting on and taking off regular lower body clothing?   [ ] 1   [ ] 2   [X] 3   [ ] 4   2.   Bathing (including washing, rinsing, drying)? [ ] 1   [ ] 2   [X] 3   [ ] 4   3. Toileting, which includes using toilet, bedpan or urinal?   [ ] 1   [ ] 2   [X] 3   [ ] 4   4. Putting on and taking off regular upper body clothing?   [ ] 1   [ ] 2   [ ] 3   [X] 4   5. Taking care of personal grooming such as brushing teeth? [ ] 1   [ ] 2   [ ] 3   [X] 4   6. Eating meals? [ ] 1   [ ] 2   [ ] 3   [X] 4   © 2007, Trustees of [address] [place] [address] [address] [address], under license to [employer]. All rights reserved    Score:  Initial: 21 Most Recent: X (Date: -- )     Interpretation of Tool:  Represents activities that are increasingly more difficult (i.e. Bed mobility, Transfers, Gait). Score 24 23 22-20 19-15 14-10 9-7 6       Modifier CH CI CJ CK CL CM CN         · Self Care:               - CURRENT STATUS:    CJ - 20%-39% impaired, limited or restricted               - GOAL STATUS:           CI - 1%-19% impaired, limited or restricted               - D/C STATUS:                       CJ - 20%-39% impaired, limited or restricted  [place] / [place] / Product Type: Medicare /       Medical Necessity:     · Patient demonstrates good rehab potential due to higher previous functional level. Reason for Services/Other Comments:  · Patient continues to require skilled intervention due to decreased ability to perform self care.    Use of outcome tool(s) and clinical judgement create a POC that gives a: LOW COMPLEXITY             TREATMENT:   (In addition to Assessment/Re-Assessment sessions the following treatments were rendered)      Pre-treatment Symptoms/Complaints:    Pain: Initial:   Pain Intensity 1: 0  Post Session:  0      Assessment/Reassessment only, no treatment provided today     Braces/Orthotics/Lines/Etc:   · O2 Device: Room air  Treatment/Session Assessment:    · Response to Treatment:  Agrees to therapy  · Interdisciplinary Collaboration:  · Physical Therapist  · Occupational Therapist  · Registered Nurse  ·   · After treatment position/precautions:  · Up in chair  · Bed/Chair-wheels locked  · Call light within reach  · RN notified  · Compliance with Program/Exercises: Will assess as treatment progresses. · Recommendations/Intent for next treatment session: \"Next visit will focus on advancements to more challenging activities and reduction in assistance provided\".   Total Treatment Duration:  OT Patient Time In/Time Out  Time In: 0941  Time Out: [address] [facility] [address], OTR/L

## 2017-10-04 NOTE — PROGRESS NOTES
Primary nurse Roseanne Scott is attempting to contact family to make them aware of patient's discharge orders. Will complete discharge when family arrives.

## 2017-10-04 NOTE — PROGRESS NOTES
Spoke with daughter about pt being d/c today. Daughter will be able to  pt around lunch time. D/c nurse made aware.

## 2017-10-04 NOTE — PROGRESS NOTES
Patient is discharging home today. She lives alone in her own home and has had a history of falls. She does ambulate with a walker. Unfortunately, she was hospitalized under Observation status as no medical necessity for an inpatient admission was identified. Therefore, patient does not qualify for discharge to a short-term rehab facility based on Medicare guidelines. We will plan for follow up by [facility]. for RN, PT, OT, and SW services. Several attempts have been made to contact family without success at this point. Case Management will remain available to assist as needed/appropriate.     Care Management Interventions  Transition of Care Consult (CM Consult): Discharge Planning, [address]: Yes  Discharge Durable Medical Equipment: No  Physical Therapy Consult: Yes  Occupational Therapy Consult: Yes  Speech Therapy Consult: No  Current Support Network: Lives Alone, Own Home  Confirm Follow Up Transport: Family  Plan discussed with Pt/Family/Caregiver: Yes  Freedom of Choice Offered: Yes  Discharge Location  Discharge Placement: Home with home health

## 2017-10-04 NOTE — PROGRESS NOTES
[address]  Face to Face Encounter    Patients Name: Heather Brown    YOB: 1929    Ordering Physician: Barry Men. [place], MD    Primary Diagnosis: Altered mental state  Fall  HTN (hypertension), malignant    Date of Face to Face:   10/4/2017                                  Face to Face Encounter findings are related to primary reason for home care:   yes. 1. I certify that the patient needs intermittent care as follows: skilled nursing care:  skilled observation/assessment, patient education, complex care plan management and rehabilitative nursing  physical therapy: strengthening, gait/stair training, balance training and pt/caregiver education  occupational therapy:  ADL safety (ie. cooking, bathing, dressing), ROM and pt/caregiver education    2. I certify that this patient is homebound, that is: 1) patient requires the use of a walker device, special transportation, or assistance of another to leave the home; or 2) patient's condition makes leaving the home medically contraindicated; and 3) patient has a normal inability to leave the home and leaving the home requires considerable and taxing effort. Patient may leave the home for infrequent and short duration for medical reasons, and occasional absences for non-medical reasons. Homebound status is due to the following functional limitations: Patient with strength deficits limiting the performance of all ADL's without caregiver assistance or the use of an assistive device. Patient with poor safety awareness and is at risk for falls without assistance of another person and the use of an assistive device. Patient with poor ambulation endurance limiting their safe ability to ascend/descend the required number of steps to leave the home.     3. I certify that this patient is under my care and that I, or a nurse practitioner or [address] [place] [address], or clinical nurse specialist, or certified nurse midwife, working with me, had a Face-to-Face Encounter that meets the physician Face-to-Face Encounter requirements. The following are the clinical findings from the [facility] encounter that support the need for skilled services and is a summary of the encounter: Medical Record    See discharge summary and summary of the patient's illness      Luisa Joy RN  10/4/2017      THE FOLLOWING TO BE COMPLETED BY THE COMMUNITY PHYSICIAN:    I concur with the findings described above from the F2F encounter that this patient is homebound and in need of a skilled service.     Certifying Physician: _____________________________________      Printed Certifying Physician Name: _____________________________________    Date: _________________

## 2017-10-04 NOTE — PROGRESS NOTES
Assessment completed. HOB elevated. Alert to place and person. Posey alarm activated. On RA. Running NSR on remote tele. No s/s of distress. Called family to notify about pt d/c planning, no answer. Will retry later. Bed locked, in low position, call light in reach. Will monitor.

## 2017-10-04 NOTE — PROGRESS NOTES
Discharge instructions and prescriptions provided and explained to the pt and daughter. Med side effect sheet reviewed. Opportunity for questions provided. Pt getting dressed. Instructed to call once ready to leave.

## 2017-10-04 NOTE — DISCHARGE SUMMARY
Hospitalist Discharge Summary     Admit Date:  10/2/2017  7:19 PM   Name:  Rodger Agosto   Age:  80 y.o.  :  1929   MRN:  [id number]   PCP:  Twin Esquivel MD  Treatment Team: Attending Provider: Cassidy Jolley MD; Utilization Review: [facility]; Care Manager: Gladys Portillo RN    Problem List for this Hospitalization:  Hospital Problems as of 10/4/2017  Date Reviewed: 2016          Codes Class Noted - Resolved POA    Dementia without behavioral disturbance (Chronic) ICD-10-CM: F03.90  ICD-9-CM: 294.20  10/4/2017 - Present Yes        HTN (hypertension), malignant (Chronic) ICD-10-CM: I10  ICD-9-CM: 401.0  10/3/2017 - Present Yes        DNR (do not resuscitate) (Chronic) ICD-10-CM: Z66  ICD-9-CM: V49.86  10/3/2017 - Present Yes        * (Principal)RESOLVED: Delirium ICD-10-CM: R41.0  ICD-9-CM: 780.09  10/3/2017 - 10/4/2017 Yes        RESOLVED: Fall ICD-10-CM: W19. [address]  ICD-9-CM: N103.3  10/3/2017 - 10/4/2017 Yes                Admission HPI from 10/2/2017:    \" History is from the daughter . 80yr year old  female pt,who lives alone,walks with a walker,known hx of cad,dm type 2,htn,hx of tia  Was brought to the er with the c/o being found on the floor  and confused.     According to daughter,pt does have h/o vertigo and falls. Since past 1 week has been talking about and seeing people who are . In er pt couldn't say were she was,after giving several clues felt that she might in a doctors office. Pt also was saying when can she clock out- felt she was at work.     Pt does have good remote memory ,could recognise her daughter,son in law in the room,could say her home address appropriately. Says doesn't remember how she fell on the floor.   Didn't c/o dizziness but says some times would get dizzy while getting up from a sitting position.     Labs ,ekg,urine ,cxr,ct head were normal.  No focal neurological deficits on examation except for the above mentioned problems. Did c/o nausea for which she was given meds in er. \"    Hospital Course:  Placed in observation. Workup was negative. Today she is responding appropriately even if delayed. Pt says she remembers her fall and says she did not hit her head. I suspect she has some underlying dementia. Placement would be ideal.  Unfortunately, medicare will not pay for this so next best option is home health. In any case, these are not long term solutions even if Medicare would pay for short term rehab, and family may need to address her living situation at some point. Her BP was labile here and this should be followed up at PCP to ensure it is OK outside the hospital.    Follow up instructions below. Plan was discussed with pt. All questions answered. Patient was stable at time of discharge and was instructed to call or return if there are any concerns or recurrence of symptoms. Diagnostic Imaging/Tests:   Xr Spine Lumb 2 Or 3 V    Result Date: 10/2/2017  EXAM:  XR SPINE LUMB 2 OR 3 V INDICATION:   found in floor with increasing pain beyond baseline COMPARISON: None. FINDINGS: AP, lateral and spot lateral views of the lumbar spine demonstrate levoscoliosis. There is a chronic superior endplate compression fracture of L3. The vertebrae are otherwise anatomically aligned. The posterior elements are intact. There is no fracture, subluxation or other abnormality. IMPRESSION:  Chronic superior endplate compression fracture of L3. Levoscoliosis. Mri Brain Wo Cont    Result Date: 10/3/2017  History: Confusion, found on floor. History of vertigo and falls. EXAM: MRI brain without contrast TECHNIQUE: Multiplanar multisequence imaging is performed. COMPARISON: CT head without contrast dated 10/2/2017 FINDINGS: The exam is limited due to patient motion. Remote right basal ganglia lacunar infarct again noted. Confluent T2 signal prolongation noted in the corona radiata and centrum semiovale bilaterally.  There is generalized cerebral atrophy. Normal flow voids are present within the major intracranial vessels. There is no mass or mass effect. There is no midline shift. The basilar cisterns are patent. The paranasal sinuses and mastoid air cells are clear. Gradient echo sequence imaging demonstrates no blooming artifact to suggest retained intracranial blood products. Diffusion-weighted imaging demonstrates no restricted diffusion to suggest an acute or early subacute infarct. IMPRESSION: 1. Chronic appearing changes as described without evidence of acute or early subacute infarct. 2. Limited exam due to patient motion. Ct Head Wo Cont    Result Date: 10/2/2017  CT HEAD WITHOUT CONTRAST HISTORY:  Transient alteration of awareness. COMPARISON: 8/25/2017 TECHNIQUE: Axial imaging was performed without intravenous contrast utilizing 5mm slice thickness. Sagittal and coronal reformats were performed. 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    -  No intracranial mass, hematoma, or hydrocephalus. -  For patient's age, the scattered areas of white matter hypodensities may represent a chronic small vessel white matter ischemia. However this is nonspecific. *VISUALIZED PARANASAL SINUSES: Well aerated. *MASTOIDS:  Clear. *CALVARIUM AND SCALP: Unremarkable. IMPRESSION: No evidence of acute intracranial abnormalities. Date of Dictation: 10/2/2017 10:03 PM     Xr Chest Port    Result Date: 10/3/2017  EXAM:  XR CHEST PORT INDICATION:  Weakness and fatigue COMPARISON:  1/21/2017 FINDINGS: A portable AP radiograph of the chest was obtained at 0009 hours. The patient is on a cardiac monitor. COPD with clear lungs. The cardiac and mediastinal contours and pulmonary vascularity are normal.  The bones and soft tissues are grossly within normal limits. IMPRESSION: COPD. No acute cardiopulmonary disease. Echocardiogram results:  No results found for this visit on 10/02/17.       All Micro Results     None          Labs: Results:       BMP, Mg, Phos Recent Labs      10/02/17   1939   NA  139   K  4.3   CL  107   CO2  23   AGAP  9   BUN  21   CREA  0.86   CA  9.7   GLU  118*      CBC Recent Labs      10/02/17   1939   WBC  10.3   RBC  4.33   HGB  11.7   HCT  35.2*   PLT  217   GRANS  84*   LYMPH  11*   EOS  0*   MONOS  5   BASOS  0   IG  0.2   ANEU  8.6*   ABL  1.1   CLAUDIA  0.0   ABM  0.5   ABB  0.0   AIG  0.0      LFT Recent Labs      10/02/17   1939   SGOT  35   ALT  13   AP  73   TP  7.5   ALB  3.7   GLOB  3.8*   AGRAT  1.0*      Cardiac Testing Lab Results   Component Value Date/Time     01/21/2017 04:00 PM    CK 69 10/02/2017 07:39 PM    Troponin-I, Qt. <0.02 10/02/2017 05:50 PM    Troponin-I, Qt. <0.02 07/21/2017 05:28 PM    Troponin-I, Qt. 0.93 01/21/2017 07:30 PM      Coagulation Tests No results found for: PTP, INR, APTT   A1c Lab Results   Component Value Date/Time    Hemoglobin A1c 6.0 10/03/2017 12:00 AM      Lipid Panel Lab Results   Component Value Date/Time    Cholesterol, total 160 06/13/2017 08:53 AM    HDL Cholesterol 49 06/13/2017 08:53 AM    LDL, calculated 83 06/13/2017 08:53 AM    VLDL, calculated 28 06/13/2017 08:53 AM    Triglyceride 139 06/13/2017 08:53 AM      Thyroid Panel Lab Results   Component Value Date/Time    T4, Total 7.4 06/13/2017 08:53 AM    T4, Total 5.7 03/14/2017 08:46 AM    TSH 1.240 08/01/2017 02:52 PM    TSH 8.910 06/13/2017 08:53 AM        Most Recent UA No results found for: COLOR, APPRN, REFSG, LENI, PROTU, GLUCU, KETU, BILU, BLDU, UROU, FAVIAN, LEUKU     Allergies   Allergen Reactions    Albuterol Other (comments)     tremors    Ciprofloxacin Swelling    Codeine Unknown (comments)    Demerol [Meperidine] Nausea and Vomiting     Immunization History   Administered Date(s) Administered    Influenza High Dose Vaccine PF 09/07/2016    Pneumococcal Conjugate (PCV-13) 05/22/2015    Pneumococcal Polysaccharide (PPSV-23) 10/12/2005    Zoster Vaccine, Live 03/17/2017       All Labs from Last 24 Hrs:  Recent Results (from the past 24 hour(s))   GLUCOSE, POC    Collection Time: 10/03/17 11:16 AM   Result Value Ref Range    Glucose (POC) 107 (H) 65 - 100 mg/dL   GLUCOSE, POC    Collection Time: 10/03/17  3:42 PM   Result Value Ref Range    Glucose (POC) 98 65 - 100 mg/dL   GLUCOSE, POC    Collection Time: 10/03/17  8:42 PM   Result Value Ref Range    Glucose (POC) 105 (H) 65 - 100 mg/dL   GLUCOSE, POC    Collection Time: 10/04/17  5:24 AM   Result Value Ref Range    Glucose (POC) 91 65 - 100 mg/dL       Discharge Exam:  Patient Vitals for the past 24 hrs:   Temp Pulse Resp BP SpO2   10/04/17 0345 97 °F (36.1 °C) 67 16 116/70 93 %   10/03/17 2300 98 °F (36.7 °C) 70 16 120/54 96 %   10/03/17 1940 98.6 °F (37 °C) 78 18 97/63 94 %   10/03/17 1930 - 78 - 97/65 -   10/03/17 1813 - - - 131/74 -   10/03/17 1545 98.5 °F (36.9 °C) 80 20 95/56 91 %   10/03/17 1313 - 96 - 109/59 -   10/03/17 1120 98.5 °F (36.9 °C) 91 16 130/79 95 %   10/03/17 0805 98.5 °F (36.9 °C) 78 20 (!) 162/91 99 %     Oxygen Therapy  O2 Sat (%): 93 % (10/04/17 0345)  Pulse via Oximetry: 87 beats per minute (10/03/17 0152)  O2 Device: Room air (10/02/17 3947)    Intake/Output Summary (Last 24 hours) at 10/04/17 0728  Last data filed at 10/03/17 1912   Gross per 24 hour   Intake              270 ml   Output                0 ml   Net              270 ml       General:    Well nourished. Alert. No distress. Eyes:   Normal sclera. Extraocular movements intact. ENT:  Normocephalic, atraumatic. Moist mucous membranes  CV:   Regular rate and rhythm. No murmur, rub, or gallop. Lungs:  Clear to auscultation bilaterally. No wheezing, rhonchi, or rales. Abdomen: Soft, nontender, nondistended. Bowel sounds normal.   Extremities: Warm and dry. No cyanosis or edema. Neurologic: CN II-XII grossly intact. Sensation intact. Skin:     No rashes or jaundice. Psych:  Normal mood and affect.     Discharge Info:   Current Discharge Medication List      CONTINUE these medications which have NOT CHANGED    Details   pravastatin (PRAVACHOL) 20 mg tablet Take 2 Tabs by mouth nightly. Qty: 90 Tab, Refills: 3    Associated Diagnoses: Dyslipidemia      potassium chloride (K-DUR, KLOR-CON) 20 mEq tablet Take 1 Tab by mouth two (2) times a day. Qty: 60 Tab, Refills: 3    Associated Diagnoses: HTN (hypertension), benign      triamterene-hydroCHLOROthiazide (DYAZIDE) 37.5-25 mg per capsule Take 1 Cap by mouth daily. Qty: 180 Cap, Refills: 3    Associated Diagnoses: HTN (hypertension), benign      metoprolol tartrate (LOPRESSOR) 50 mg tablet Take 0.5 Tabs by mouth two (2) times a day. Qty: 180 Tab, Refills: 3    Associated Diagnoses: HTN (hypertension), benign      pantoprazole (PROTONIX) 40 mg tablet Take 1 Tab by mouth daily. Indications: gastroesophageal reflux disease  Qty: 90 Tab, Refills: 3    Associated Diagnoses: Gastroesophageal reflux disease without esophagitis      levothyroxine (SYNTHROID) 112 mcg tablet Take 1 Tab by mouth daily. Indications: hypothyroidism  Qty: 90 Tab, Refills: 3    Associated Diagnoses: Acquired hypothyroidism      sertraline (ZOLOFT) 100 mg tablet Take 100 mg by mouth daily. meclizine (ANTIVERT) 25 mg tablet Take 1 Tab by mouth three (3) times daily as needed. Indications: VERTIGO  Qty: 90 Tab, Refills: 3    Associated Diagnoses: Vertigo      losartan (COZAAR) 100 mg tablet Take 1 Tab by mouth daily. Qty: 90 Tab, Refills: 3    Associated Diagnoses: Benign essential HTN      promethazine (PHENERGAN) 25 mg tablet Take 1 Tab by mouth every six (6) hours as needed for Nausea. Qty: 24 Tab, Refills: 3    Associated Diagnoses: Nausea      ferrous sulfate 325 mg (65 mg iron) tablet Take 1 Tab by mouth Daily (before breakfast).   Qty: 90 Tab, Refills: 3    Associated Diagnoses: Iron deficiency anemia secondary to inadequate dietary iron intake      ascorbic acid, vitamin C, (VITAMIN C) 500 mg tablet Take 1 Tab by mouth two (2) times a day. Qty: 180 Tab, Refills: 3    Associated Diagnoses: Iron deficiency anemia secondary to inadequate dietary iron intake      cyanocobalamin (VITAMIN B-12) 1,000 mcg tablet Take 1,000 mcg by mouth daily. nitroglycerin (NITROSTAT) 0.4 mg SL tablet by SubLINGual route every five (5) minutes as needed for Chest Pain. ADVAIR DISKUS 250-50 mcg/dose diskus inhaler Take 1 Puff by inhalation two (2) times a day. clopidogrel (PLAVIX) 75 mg tablet 75 mg daily. allopurinol (ZYLOPRIM) 100 mg tablet 100 mg daily. folic acid (FOLVITE) 1 mg tablet Take 1 mg by mouth daily. metFORMIN (GLUCOPHAGE) 500 mg tablet Take 500 mg by mouth two (2) times a day. aspirin delayed-release 81 mg tablet Take 81 mg by mouth daily. Disposition: home    Activity: PT/OT per Home Health  Diet: DIET CARDIAC Regular    Follow-up Appointments   Procedures    FOLLOW UP VISIT Appointment in: One Week PCP to see if BP stable     PCP to see if BP stable     Standing Status:   Standing     Number of Occurrences:   1     Order Specific Question:   Appointment in     Answer: One Week         Follow-up Information     Follow up With Details Comments [id number] Toshia Abebe MD In 1 week follow up to see if BP stable [address] [place] [address]  [phone]            Time spent in patient discharge planning and coordination 35 minutes.     Signed:  Enio Rodriguez MD

## 2017-10-04 NOTE — PROGRESS NOTES
Problem: Mobility Impaired (Adult and Pediatric)  Goal: *Acute Goals and Plan of Care (Insert Text)  LTG:  (1.)Ms. Makeda Murphy will move from supine to sit and sit to supine , scoot up and down and roll side to side in bed with MODIFIED INDEPENDENCE within 7 day(s). (2.)Ms. Makeda Murphy will transfer from bed to chair and chair to bed with MODIFIED INDEPENDENCE using the least restrictive device within 7 day(s). (3.)Ms. Makeda Murphy will ambulate with SUPERVISION for 75 feet with the least restrictive device within 7 day(s). (4.)Ms. Makeda Murphy will participate in therapeutic activity/exerices x 23 mintues for increased strength within 7 days. ________________________________________________________________________________________________      PHYSICAL THERAPY: INITIAL ASSESSMENT, AM 10/4/2017  OBSERVATION: Hospital Day: 3  [place] / Product Type: Medicare /      NAME/AGE/GENDER: Neymar Paulion is a 80 y.o. female       PRIMARY DIAGNOSIS: Altered mental state  Fall  HTN (hypertension), malignant Delirium Delirium        ICD-10: Treatment Diagnosis:       · Generalized Muscle Weakness (M62.81)  · Difficulty in walking, Not elsewhere classified (R26.2)  · Repeated Falls (R29.6)  · History of falling (Z91.81)   Precaution/Allergies:  Albuterol; Ciprofloxacin; Codeine; and Demerol [meperidine]       ASSESSMENT:      Ms. Makeda Murphy is a 80 y.o. female in the hospital for the above with a history of numerous falls recently. Pt exhibited both decreased attention and complex command following during evaluation as well as disorientation to date. Pt reports that she lives alone in a one story house with steps to enter. She also stated that she was independent with ADLs at baseline and uses a rollator for ambulation. According to pt, her daughters assist with cooking sometimes but she continues to use oven herself.   Ms. Makeda Murphy presents to PT with generally decreased strength/AROM in B LEs (grossly 3- to 3+).  She also has decreased B dorsiflexion PROM. Pt was able to stand with CGA/RW but demonstrated fair standing balance. She ambulated in roberson as well but often required min assist for negotiating obstacles with RW. Pt observed to have decreased gait speed with short, shuffled steps and narrowed WESLEY. Pt also transferred to bedside chair but required min assist for safety awareness. Pt is at high risk of falling again and could benefit from skilled PT to address above deficits. Ms. Johan Leos was discharged from our facility before further treatment could be provided in this setting. This section established at most recent assessment   PROBLEM LIST (Impairments causing functional limitations):  1. Decreased Strength  2. Decreased Transfer Abilities  3. Decreased Ambulation Ability/Technique  4. Decreased Balance  5. Decreased Flexibility/Joint Mobility  6. Decreased Cognition    INTERVENTIONS PLANNED: (Benefits and precautions of physical therapy have been discussed with the patient.)  1. Balance Exercise  2. Bed Mobility  3. Family Education  4. Gait Training  5. Neuromuscular Re-education/Strengthening  6. Therapeutic Activites  7. Therapeutic Exercise/Strengthening  8. Transfer Training  9. Group Therapy      TREATMENT PLAN: Frequency/Duration: 3 times a week for duration of hospital stay  Rehabilitation Potential For Stated Goals: Helene Shen REHABILITATION/EQUIPMENT: (at time of discharge pending progress): Due to the probability of continued deficits (see above) this patient will likely need continued skilled physical therapy after discharge. Equipment:   · None at this time                   HISTORY:   History of Present Injury/Illness (Reason for Referral):  Per H&P: \"  HPI:   History is from the daughter .   80yr year old  female pt,who lives alone,walks with a walker,known hx of cad,dm type 2,htn,hx of tia  Was brought to the er with the c/o being found on the floor  and confused. According to daughter,pt does have h/o vertigo and falls. Since past 1 week has been talking about and seeing people who are . In er pt couldn't say were she was,after giving several clues felt that she might in a doctors office. Pt also was saying when can she clock out- felt she was at work. Pt does have good remote memory ,could recognise her daughter,son in law in the room,could say her home address appropriately. Says doesn't remember how she fell on the floor. Didn't c/o dizziness but says some times would get dizzy while getting up from a sitting position. Labs ,ekg,urine ,cxr,ct head were normal.  No focal neurological deficits on examation except for the above mentioned problems. Did c/o nausea for which she was given meds in er. 10 systems reviewed and negative except as noted in HPI. \"  Past Medical History/Comorbidities:   Ms. Andreina Yusuf  has a past medical history of ASCAD - (unspecified) (2016); Chest pain; Diabetes mellitus ([facility] [address] HTN - controlled, benign (2016); Lipid - Hyperlipidemia other unsp Dyslipidemia (2016); Neurological disorder; Observation for suspected cardiovascular disease; and Respiratory insufficiency. Ms. Andreina Yusuf  has a past surgical history that includes heart catheterization (3/31/08); coronary stent placement; cholecystectomy; hysterectomy; and orthopaedic (Left). Social History/Living Environment:   Home Environment: Private residence  # Steps to Enter: 3  Rails to Enter: Yes  Hand Rails : Left  One/Two Story Residence: One story  Living Alone: Yes  Support Systems: Child(carlos)  Patient Expects to be Discharged to[de-identified] Private residence  Current DME Used/Available at Home: Suzanna Malone, Wilfredo Rogers, Walker, rollator, [address] chair  Tub or Shower Type: Tub/Shower combination  Prior Level of Function/Work/Activity:  Lives alone in one story house with steps to enter. Independent with ADLs but uses rollator for ambulation.   Numerous recent falls reported. Number of Personal Factors/Comorbidities that affect the Plan of Care:  DM  Falls  Lives alone 3+: HIGH COMPLEXITY   EXAMINATION:   Most Recent Physical Functioning:   Gross Assessment:  AROM: Generally decreased, functional (B hip flexion)  PROM: Generally decreased, functional (B dorsiflexion)  Strength: Generally decreased, functional (B LEs 3-/3+)               Posture:  Posture (WDL): Exceptions to WDL  Posture Assessment: Rounded shoulders  Balance:  Sitting: Impaired  Sitting - Static: Prop sitting  Sitting - Dynamic: Prop sitting  Standing: Impaired  Standing - Static: Fair  Standing - Dynamic : Fair Bed Mobility:     Wheelchair Mobility:     Transfers:  Sit to Stand: Contact guard assistance  Stand to Sit: Contact guard assistance  Bed to Chair: Contact guard assistance;Minimum assistance  Gait:     Base of Support: Narrowed  Speed/Marina: Slow;Shuffled  Step Length: Right shortened;Left shortened  Gait Abnormalities: Decreased step clearance; Path deviations; Shuffling gait  Distance (ft): 80 Feet (ft)  Assistive Device: Walker, rolling  Ambulation - Level of Assistance: Contact guard assistance;Minimal assistance       Body Structures Involved:  1. Nerves  2. Muscles Body Functions Affected:  1. Mental  2. Neuromusculoskeletal  3. Movement Related Activities and Participation Affected:  1. Learning and Applying Knowledge  2. General Tasks and Demands  3. Mobility  4. Domestic Life  5. Community, Social and [place]   Number of elements that affect the Plan of Care: 4+: HIGH COMPLEXITY   CLINICAL PRESENTATION:   Presentation: Stable and uncomplicated: LOW COMPLEXITY   CLINICAL DECISION MAKIN [facility] Mobility Inpatient Short Form  How much difficulty does the patient currently have. .. Unable A Lot A Little None   1. Turning over in bed (including adjusting bedclothes, sheets and blankets)? [ ] 1   [ ] 2   [ ] 3   [X] 4   2.   Sitting down on and standing up from a chair with arms ( e.g., wheelchair, bedside commode, etc.)   [ ] 1   [ ] 2   [X] 3   [ ] 4   3. Moving from lying on back to sitting on the side of the bed? [ ] 1   [ ] 2   [ ] 3   [X] 4   How much help from another person does the patient currently need. .. Total A Lot A Little None   4. Moving to and from a bed to a chair (including a wheelchair)? [ ] 1   [ ] 2   [X] 3   [ ] 4   5. Need to walk in hospital room? [ ] 1   [ ] 2   [X] 3   [ ] 4   6. Climbing 3-5 steps with a railing? [ ] 1   [ ] 2   [X] 3   [ ] 4   © 2007, Trustees of [address] [place] [address], under license to [employer]. All rights reserved    Score:  Initial: 20 Most Recent: X (Date: -- )     Interpretation of Tool:  Represents activities that are increasingly more difficult (i.e. Bed mobility, Transfers, Gait). Score 24 23 22-20 19-15 14-10 9-7 6       Modifier CH CI CJ CK CL CM CN         · Mobility - Walking and Moving Around:               - CURRENT STATUS:    CJ - 20%-39% impaired, limited or restricted               - GOAL STATUS:           CI - 1%-19% impaired, limited or restricted               - D/C STATUS:                       CJ - 20%-39% impaired, limited or restricted  [place] / [place] / Product Type: Medicare /       Medical Necessity:     · Patient demonstrates fair rehab potential due to higher previous functional level. Reason for Services/Other Comments:  · Patient continues to require skilled intervention due to decreased balance and ambulation.    Use of outcome tool(s) and clinical judgement create a POC that gives a: Clear prediction of patient's progress: LOW COMPLEXITY                 TREATMENT:   (In addition to Assessment/Re-Assessment sessions the following treatments were rendered)   Pre-treatment Symptoms/Complaints:  Drowsy  Pain: Initial:   Pain Intensity 1: 0  Post Session:  0      Assessment/Reassessment only, no treatment provided today     Braces/Orthotics/Lines/Etc:   · O2 Device: Room air  Treatment/Session Assessment:    · Response to Treatment:  Pt tolerated fairly given decreased attention and safety awareness. · Interdisciplinary Collaboration:  · Physical Therapist  · Occupational Therapist  · Registered Nurse  · Student RN  · After treatment position/precautions:  · Up in chair  · Bed/Chair-wheels locked  · Call light within reach  · Posey alarm activated  · Compliance with Program/Exercises: Will assess as treatment progresses. · Recommendations/Intent for next treatment session: \"Next visit will focus on advancements to more challenging activities and reduction in assistance provided\".   Total Treatment Duration:  PT Patient Time In/Time Out  Time In: 0752  Time Out: [address] [place] Staci Dowell PT, DPT

## 2017-10-04 NOTE — DISCHARGE INSTRUCTIONS
Learning About High Blood Pressure  What is high blood pressure? Blood pressure is a measure of how hard the blood pushes against the walls of your arteries. It's normal for blood pressure to go up and down throughout the day, but if it stays up, you have high blood pressure. Another name for high blood pressure is hypertension. Two numbers tell you your blood pressure. The first number is the systolic pressure. It shows how hard the blood pushes when your heart is pumping. The second number is the diastolic pressure. It shows how hard the blood pushes between heartbeats, when your heart is relaxed and filling with blood. A blood pressure of less than 120/80 (say \"120 over 80\") is ideal for an adult. High blood pressure is 140/90 or higher. You have high blood pressure if your top number is 140 or higher or your bottom number is 90 or higher, or both. Many people fall into the category in between, called prehypertension. People with prehypertension need to make lifestyle changes to bring their blood pressure down and help prevent or delay high blood pressure. What happens when you have high blood pressure? · Blood flows through your arteries with too much force. Over time, this damages the walls of your arteries. But you can't feel it. High blood pressure usually doesn't cause symptoms. · Fat and calcium start to build up in your arteries. This buildup is called plaque. Plaque makes your arteries narrower and stiffer. Blood can't flow through them as easily. · This lack of good blood flow starts to damage some of the organs in your body. This can lead to problems such as coronary artery disease and heart attack, heart failure, stroke, kidney failure, and eye damage. How can you prevent high blood pressure? · Stay at a healthy weight. · Try to limit how much sodium you eat to less than 2,300 milligrams (mg) a day.  If you limit your sodium to 1,500 mg a day, you can lower your blood pressure even more.  ¨ Buy foods that are labeled \"unsalted,\" \"sodium-free,\" or \"low-sodium. \" Foods labeled \"reduced-sodium\" and \"light sodium\" may still have too much sodium. ¨ Flavor your food with garlic, lemon juice, onion, vinegar, herbs, and spices instead of salt. Do not use soy sauce, steak sauce, onion salt, garlic salt, mustard, or ketchup on your food. ¨ Use less salt (or none) when recipes call for it. You can often use half the salt a recipe calls for without losing flavor. · Be physically active. Get at least 30 minutes of exercise on most days of the week. Walking is a good choice. You also may want to do other activities, such as running, swimming, cycling, or playing tennis or team sports. · Limit alcohol to 2 drinks a day for men and 1 drink a day for women. · Eat plenty of fruits, vegetables, and low-fat dairy products. Eat less saturated and total fats. How is high blood pressure treated? · Your doctor will suggest making lifestyle changes. For example, your doctor may ask you to eat healthy foods, quit smoking, lose extra weight, and be more active. · If lifestyle changes don't help enough or your blood pressure is very high, you will have to take medicine every day. Follow-up care is a key part of your treatment and safety. Be sure to make and go to all appointments, and call your doctor if you are having problems. It's also a good idea to know your test results and keep a list of the medicines you take. Where can you learn more? Go to http://saul-mil.info/. Enter P501 in the search box to learn more about \"Learning About High Blood Pressure. \"  Current as of: March 23, 2016  Content Version: 11.3  © 5281-8708 [employer]. Care instructions adapted under license by [employer] (which disclaims liability or warranty for this information).  If you have questions about a medical condition or this instruction, always ask your healthcare professional. [employer] disclaims any warranty or liability for your use of this information. DISCHARGE SUMMARY from Nurse    The following personal items are in your possession at time of discharge:    Dental Appliances: None  Visual Aid: Glasses, With patient     Home Medications: None  Jewelry: None  Clothing: None  Other Valuables: None             PATIENT INSTRUCTIONS:    After general anesthesia or intravenous sedation, for 24 hours or while taking prescription Narcotics:  · Limit your activities  · Do not drive and operate hazardous machinery  · Do not make important personal or business decisions  · Do  not drink alcoholic beverages  · If you have not urinated within 8 hours after discharge, please contact your surgeon on call. Report the following to your surgeon:  · Excessive pain, swelling, redness or odor of or around the surgical area  · Temperature over 100.5  · Nausea and vomiting lasting longer than 4 hours or if unable to take medications  · Any signs of decreased circulation or nerve impairment to extremity: change in color, persistent  numbness, tingling, coldness or increase pain  · Any questions        What to do at Home:  Recommended activity: Activity as tolerated,     *  Please give a list of your current medications to your Primary Care Provider. *  Please update this list whenever your medications are discontinued, doses are      changed, or new medications (including over-the-counter products) are added. *  Please carry medication information at all times in case of emergency situations. These are general instructions for a healthy lifestyle:    No smoking/ No tobacco products/ Avoid exposure to second hand smoke    Surgeon General's Warning:  Quitting smoking now greatly reduces serious risk to your health.     Obesity, smoking, and sedentary lifestyle greatly increases your risk for illness    A healthy diet, regular physical exercise & weight monitoring are important for maintaining a healthy lifestyle    You may be retaining fluid if you have a history of heart failure or if you experience any of the following symptoms:  Weight gain of 3 pounds or more overnight or 5 pounds in a week, increased swelling in our hands or feet or shortness of breath while lying flat in bed. Please call your doctor as soon as you notice any of these symptoms; do not wait until your next office visit. Recognize signs and symptoms of STROKE:    F-face looks uneven    A-arms unable to move or move unevenly    S-speech slurred or non-existent    T-time-call 911 as soon as signs and symptoms begin-DO NOT go       Back to bed or wait to see if you get better-TIME IS BRAIN. Warning Signs of HEART ATTACK     Call 911 if you have these symptoms:   Chest discomfort. Most heart attacks involve discomfort in the center of the chest that lasts more than a few minutes, or that goes away and comes back. It can feel like uncomfortable pressure, squeezing, fullness, or pain.  Discomfort in other areas of the upper body. Symptoms can include pain or discomfort in one or both arms, the back, neck, jaw, or stomach.  Shortness of breath with or without chest discomfort.  Other signs may include breaking out in a cold sweat, nausea, or lightheadedness. Don't wait more than five minutes to call 911 - MINUTES MATTER! Fast action can save your life. Calling 911 is almost always the fastest way to get lifesaving treatment. Emergency Medical Services staff can begin treatment when they arrive -- up to an hour sooner than if someone gets to the hospital by car. The discharge information has been reviewed with the patient. The patient verbalized understanding. Discharge medications reviewed with the patient and appropriate educational materials and side effects teaching were provided.